# Patient Record
Sex: MALE | Race: WHITE | URBAN - METROPOLITAN AREA
[De-identification: names, ages, dates, MRNs, and addresses within clinical notes are randomized per-mention and may not be internally consistent; named-entity substitution may affect disease eponyms.]

---

## 2020-08-03 ENCOUNTER — HOSPITAL ENCOUNTER (OUTPATIENT)
Age: 50
Discharge: HOME | End: 2020-08-05

## 2020-08-03 PROCEDURE — 88305 TISSUE EXAM BY PATHOLOGIST: CPT

## 2020-09-18 LAB
SARS-COV-2, PCR: NOT DETECTED
SPECIMEN SOURCE: NORMAL

## 2020-09-23 LAB — SURGICAL PATHOLOGY REPORT: NORMAL

## 2021-12-02 RX ORDER — SODIUM CHLORIDE 9 MG/ML
100 INJECTION, SOLUTION INTRAVENOUS CONTINUOUS PRN
Status: CANCELLED | OUTPATIENT
Start: 2021-12-02

## 2021-12-02 RX ORDER — SODIUM CHLORIDE 9 MG/ML
25 INJECTION, SOLUTION INTRAVENOUS PRN
Status: CANCELLED | OUTPATIENT
Start: 2021-12-02

## 2021-12-02 RX ORDER — SODIUM CHLORIDE 9 MG/ML
INJECTION, SOLUTION INTRAVENOUS CONTINUOUS PRN
Status: CANCELLED | OUTPATIENT
Start: 2021-12-02 | End: 2021-12-03

## 2021-12-02 RX ORDER — SODIUM CHLORIDE 0.9 % (FLUSH) 0.9 %
5-40 SYRINGE (ML) INJECTION PRN
Status: CANCELLED | OUTPATIENT
Start: 2021-12-02

## 2021-12-02 RX ORDER — DIPHENHYDRAMINE HYDROCHLORIDE 50 MG/ML
50 INJECTION INTRAMUSCULAR; INTRAVENOUS
Status: CANCELLED | OUTPATIENT
Start: 2021-12-02 | End: 2021-12-02

## 2021-12-02 RX ORDER — METHYLPREDNISOLONE SODIUM SUCCINATE 125 MG/2ML
125 INJECTION, POWDER, LYOPHILIZED, FOR SOLUTION INTRAMUSCULAR; INTRAVENOUS
Status: CANCELLED | OUTPATIENT
Start: 2021-12-02 | End: 2021-12-02

## 2021-12-02 RX ORDER — EPINEPHRINE 1 MG/ML
0.3 INJECTION, SOLUTION, CONCENTRATE INTRAVENOUS PRN
Status: CANCELLED | OUTPATIENT
Start: 2021-12-02

## 2021-12-02 RX ORDER — SODIUM CHLORIDE 0.9 % (FLUSH) 0.9 %
5-40 SYRINGE (ML) INJECTION EVERY 12 HOURS SCHEDULED
Status: CANCELLED | OUTPATIENT
Start: 2021-12-02

## 2021-12-03 ENCOUNTER — HOSPITAL ENCOUNTER (OUTPATIENT)
Dept: INFUSION THERAPY | Age: 51
Setting detail: INFUSION SERIES
Discharge: HOME | End: 2021-12-03
Payer: COMMERCIAL

## 2021-12-03 VITALS
RESPIRATION RATE: 18 BRPM | TEMPERATURE: 99.1 F | OXYGEN SATURATION: 98 % | HEART RATE: 89 BPM | SYSTOLIC BLOOD PRESSURE: 144 MMHG | DIASTOLIC BLOOD PRESSURE: 91 MMHG

## 2021-12-03 PROCEDURE — M0243 CASIRIVI AND IMDEVI INFUSION: HCPCS

## 2021-12-03 PROCEDURE — 6360000002 HC RX W HCPCS: Performed by: INTERNAL MEDICINE

## 2021-12-03 PROCEDURE — 2580000003 HC RX 258: Performed by: INTERNAL MEDICINE

## 2021-12-03 PROCEDURE — 2500000003 HC RX 250 WO HCPCS: Performed by: INTERNAL MEDICINE

## 2021-12-03 RX ORDER — SODIUM CHLORIDE 0.9 % (FLUSH) 0.9 %
5-40 SYRINGE (ML) INJECTION PRN
Status: DISCONTINUED | OUTPATIENT
Start: 2021-12-03 | End: 2021-12-04 | Stop reason: HOSPADM

## 2021-12-03 RX ORDER — SODIUM CHLORIDE 9 MG/ML
INJECTION, SOLUTION INTRAVENOUS CONTINUOUS PRN
Status: ACTIVE | OUTPATIENT
Start: 2021-12-03 | End: 2021-12-03

## 2021-12-03 RX ADMIN — Medication 10 ML: at 13:44

## 2021-12-03 RX ADMIN — SODIUM CHLORIDE: 9 INJECTION, SOLUTION INTRAVENOUS at 13:44

## 2021-12-03 RX ADMIN — Medication 10 ML: at 14:17

## 2021-12-03 RX ADMIN — IMDEVIMAB: 300 INJECTION, SOLUTION, CONCENTRATE INTRAVENOUS at 13:43

## 2021-12-17 ENCOUNTER — HOSPITAL ENCOUNTER (OUTPATIENT)
Age: 51
Discharge: HOME | End: 2021-12-19

## 2021-12-17 PROCEDURE — 88305 TISSUE EXAM BY PATHOLOGIST: CPT

## 2023-08-28 ENCOUNTER — HOSPITAL ENCOUNTER (OUTPATIENT)
Dept: DATA CONVERSION | Facility: HOSPITAL | Age: 53
End: 2023-08-28
Attending: STUDENT IN AN ORGANIZED HEALTH CARE EDUCATION/TRAINING PROGRAM | Admitting: STUDENT IN AN ORGANIZED HEALTH CARE EDUCATION/TRAINING PROGRAM

## 2023-08-28 DIAGNOSIS — R94.39 ABNORMAL RESULT OF OTHER CARDIOVASCULAR FUNCTION STUDY: ICD-10-CM

## 2023-08-28 DIAGNOSIS — I20.9 ANGINA PECTORIS, UNSPECIFIED (CMS-HCC): ICD-10-CM

## 2023-08-28 DIAGNOSIS — R06.02 SHORTNESS OF BREATH: ICD-10-CM

## 2023-08-28 DIAGNOSIS — R07.9 CHEST PAIN, UNSPECIFIED: ICD-10-CM

## 2023-08-28 DIAGNOSIS — I10 ESSENTIAL (PRIMARY) HYPERTENSION: ICD-10-CM

## 2023-08-28 LAB
CHOLESTEROL (MG/DL) IN SER/PLAS: 202 MG/DL (ref 0–199)
CHOLESTEROL IN HDL (MG/DL) IN SER/PLAS: 35.9 MG/DL
CHOLESTEROL/HDL RATIO: 5.6
LDL: 153 MG/DL (ref 0–99)
TRIGLYCERIDE (MG/DL) IN SER/PLAS: 68 MG/DL (ref 0–149)
VLDL: 14 MG/DL (ref 0–40)

## 2023-09-30 NOTE — H&P
History & Physical Reviewed:   I have reviewed the History and Physical dated:  28-Aug-2023   History and Physical reviewed and relevant findings noted. Patient examined to review pertinent physical  findings.: No significant changes   Home Medications Reviewed: no changes noted   Allergies Reviewed: no changes noted       Airway/Sedation Assessment:  ·  Emotional Status calm   ·  Neurologic alert & oriented x 3   ·  Respiratory clear to auscultation   ·  Cardiovascular rhythm & rate regular   ·  GI/ soft, nontender     · Pulses present: Pedal Left, Pedal Right, Radial Left, Radial Right     ·  Oropharyngeal Classification Class II   ·  ASA PS Classification ASA III   ·  Sedation Plan moderate sedation       ERAS (Enhanced Recovery After Surgery):  ·  ERAS Patient: no      Consent:   COVID-19 Consent:  ·  COVID-19 Risk Consent Surgeon has reviewed key risks related to the risk of geoff COVID-19 and if they contract COVID-19 what the risks are.     Attestation:   Note Completion:  I am a:  Resident/Fellow   Attending Attestation I saw and evaluated the patient.  I personally obtained the key and critical portions of the history and physical exam or was physically present for key and  critical portions performed by the resident/fellow. I reviewed the resident/fellow?s documentation and discussed the patient with the resident/fellow.  I agree with the resident/fellow?s medical decision making as documented in the note.     I personally evaluated the patient on 28-Aug-2023         Electronic Signatures:  Javier Gerardo)  (Signed 28-Aug-2023 11:00)   Authored: ERAS, Note Completion   Co-Signer: History & Physical Reviewed, Airway/Sedation, Consent, Note Completion  Yanely Gama (Fellow))  (Signed 28-Aug-2023 08:54)   Authored: History & Physical Reviewed, Airway/Sedation,  Consent, Note Completion      Last Updated: 28-Aug-2023 11:00 by Javier Gerardo)

## 2023-10-31 DIAGNOSIS — I10 HYPERTENSION, UNSPECIFIED TYPE: Primary | ICD-10-CM

## 2023-10-31 RX ORDER — AMLODIPINE BESYLATE 10 MG/1
10 TABLET ORAL DAILY
Qty: 90 TABLET | Refills: 0 | Status: SHIPPED | OUTPATIENT
Start: 2023-10-31

## 2023-10-31 RX ORDER — AMLODIPINE BESYLATE 5 MG/1
5 TABLET ORAL DAILY
COMMUNITY
Start: 2023-09-18 | End: 2023-10-31 | Stop reason: SDUPTHER

## 2023-11-02 ENCOUNTER — APPOINTMENT (OUTPATIENT)
Dept: CARDIOLOGY | Facility: CLINIC | Age: 53
End: 2023-11-02

## 2024-08-11 ENCOUNTER — APPOINTMENT (OUTPATIENT)
Dept: RADIOLOGY | Facility: HOSPITAL | Age: 54
End: 2024-08-11
Payer: COMMERCIAL

## 2024-08-11 ENCOUNTER — APPOINTMENT (OUTPATIENT)
Dept: CARDIOLOGY | Facility: HOSPITAL | Age: 54
End: 2024-08-11
Payer: COMMERCIAL

## 2024-08-11 ENCOUNTER — HOSPITAL ENCOUNTER (INPATIENT)
Facility: HOSPITAL | Age: 54
End: 2024-08-11
Attending: EMERGENCY MEDICINE | Admitting: INTERNAL MEDICINE
Payer: COMMERCIAL

## 2024-08-11 VITALS
HEART RATE: 75 BPM | HEIGHT: 72 IN | TEMPERATURE: 96 F | WEIGHT: 208 LBS | OXYGEN SATURATION: 96 % | BODY MASS INDEX: 28.17 KG/M2 | RESPIRATION RATE: 18 BRPM | DIASTOLIC BLOOD PRESSURE: 75 MMHG | SYSTOLIC BLOOD PRESSURE: 120 MMHG

## 2024-08-11 DIAGNOSIS — I26.92 ACUTE SADDLE PULMONARY EMBOLISM, UNSPECIFIED WHETHER ACUTE COR PULMONALE PRESENT (MULTI): ICD-10-CM

## 2024-08-11 DIAGNOSIS — I21.4 NSTEMI (NON-ST ELEVATED MYOCARDIAL INFARCTION) (MULTI): Primary | ICD-10-CM

## 2024-08-11 PROBLEM — I10 HYPERTENSION: Status: ACTIVE | Noted: 2024-08-11

## 2024-08-11 PROBLEM — I10 PRIMARY HYPERTENSION: Status: ACTIVE | Noted: 2024-08-11

## 2024-08-11 LAB
ALBUMIN SERPL BCP-MCNC: 4.4 G/DL (ref 3.4–5)
ALP SERPL-CCNC: 45 U/L (ref 33–120)
ALT SERPL W P-5'-P-CCNC: 17 U/L (ref 10–52)
ANION GAP SERPL CALC-SCNC: 11 MMOL/L (ref 10–20)
AORTIC VALVE MEAN GRADIENT: 3 MMHG
AORTIC VALVE PEAK VELOCITY: 1.05 M/S
APTT PPP: 31 SECONDS (ref 27–38)
AST SERPL W P-5'-P-CCNC: 14 U/L (ref 9–39)
AV PEAK GRADIENT: 4.4 MMHG
AVA (PEAK VEL): 2.89 CM2
AVA (VTI): 2.77 CM2
BASOPHILS # BLD AUTO: 0.02 X10*3/UL (ref 0–0.1)
BASOPHILS NFR BLD AUTO: 0.2 %
BILIRUB SERPL-MCNC: 0.7 MG/DL (ref 0–1.2)
BNP SERPL-MCNC: 77 PG/ML (ref 0–99)
BUN SERPL-MCNC: 15 MG/DL (ref 6–23)
CALCIUM SERPL-MCNC: 9.7 MG/DL (ref 8.6–10.3)
CARDIAC TROPONIN I PNL SERPL HS: 244 NG/L (ref 0–20)
CARDIAC TROPONIN I PNL SERPL HS: 251 NG/L (ref 0–20)
CHLORIDE SERPL-SCNC: 102 MMOL/L (ref 98–107)
CO2 SERPL-SCNC: 27 MMOL/L (ref 21–32)
CREAT SERPL-MCNC: 1.13 MG/DL (ref 0.5–1.3)
EGFRCR SERPLBLD CKD-EPI 2021: 77 ML/MIN/1.73M*2
EJECTION FRACTION APICAL 4 CHAMBER: 58.2
EJECTION FRACTION: 61 %
EOSINOPHIL # BLD AUTO: 0.07 X10*3/UL (ref 0–0.7)
EOSINOPHIL NFR BLD AUTO: 0.8 %
ERYTHROCYTE [DISTWIDTH] IN BLOOD BY AUTOMATED COUNT: 14.4 % (ref 11.5–14.5)
GLOBAL LONGITUDINAL STRAIN: 6.1 %
GLUCOSE SERPL-MCNC: 112 MG/DL (ref 74–99)
HCT VFR BLD AUTO: 51.3 % (ref 41–52)
HGB BLD-MCNC: 17.8 G/DL (ref 13.5–17.5)
IMM GRANULOCYTES # BLD AUTO: 0.04 X10*3/UL (ref 0–0.7)
IMM GRANULOCYTES NFR BLD AUTO: 0.4 % (ref 0–0.9)
INR PPP: 1 (ref 0.9–1.1)
LACTATE SERPL-SCNC: 1 MMOL/L (ref 0.4–2)
LEFT ATRIUM VOLUME AREA LENGTH INDEX BSA: 9.4 ML/M2
LEFT VENTRICLE INTERNAL DIMENSION DIASTOLE: 4.2 CM (ref 3.5–6)
LEFT VENTRICULAR OUTFLOW TRACT DIAMETER: 2.1 CM
LV EJECTION FRACTION BIPLANE: 61 %
LYMPHOCYTES # BLD AUTO: 1.62 X10*3/UL (ref 1.2–4.8)
LYMPHOCYTES NFR BLD AUTO: 18.2 %
MAGNESIUM SERPL-MCNC: 2.06 MG/DL (ref 1.6–2.4)
MCH RBC QN AUTO: 31.7 PG (ref 26–34)
MCHC RBC AUTO-ENTMCNC: 34.7 G/DL (ref 32–36)
MCV RBC AUTO: 91 FL (ref 80–100)
MITRAL VALVE E/A RATIO: 0.49
MONOCYTES # BLD AUTO: 0.67 X10*3/UL (ref 0.1–1)
MONOCYTES NFR BLD AUTO: 7.5 %
NEUTROPHILS # BLD AUTO: 6.47 X10*3/UL (ref 1.2–7.7)
NEUTROPHILS NFR BLD AUTO: 72.9 %
NRBC BLD-RTO: 0 /100 WBCS (ref 0–0)
PLATELET # BLD AUTO: 208 X10*3/UL (ref 150–450)
POTASSIUM SERPL-SCNC: 4.1 MMOL/L (ref 3.5–5.3)
PROT SERPL-MCNC: 7.8 G/DL (ref 6.4–8.2)
PROTHROMBIN TIME: 11.2 SECONDS (ref 9.8–12.8)
RBC # BLD AUTO: 5.62 X10*6/UL (ref 4.5–5.9)
RIGHT VENTRICLE FREE WALL PEAK S': 9.79 CM/S
RIGHT VENTRICLE PEAK SYSTOLIC PRESSURE: 31.9 MMHG
SODIUM SERPL-SCNC: 136 MMOL/L (ref 136–145)
TRICUSPID ANNULAR PLANE SYSTOLIC EXCURSION: 1.3 CM
UFH PPP CHRO-ACNC: 0.2 IU/ML
UFH PPP CHRO-ACNC: 0.3 IU/ML
WBC # BLD AUTO: 8.9 X10*3/UL (ref 4.4–11.3)

## 2024-08-11 PROCEDURE — 83605 ASSAY OF LACTIC ACID: CPT | Performed by: PHYSICIAN ASSISTANT

## 2024-08-11 PROCEDURE — 2500000001 HC RX 250 WO HCPCS SELF ADMINISTERED DRUGS (ALT 637 FOR MEDICARE OP): Performed by: PHYSICIAN ASSISTANT

## 2024-08-11 PROCEDURE — 93005 ELECTROCARDIOGRAM TRACING: CPT

## 2024-08-11 PROCEDURE — 2500000004 HC RX 250 GENERAL PHARMACY W/ HCPCS (ALT 636 FOR OP/ED): Performed by: PHYSICIAN ASSISTANT

## 2024-08-11 PROCEDURE — 99223 1ST HOSP IP/OBS HIGH 75: CPT | Performed by: INTERNAL MEDICINE

## 2024-08-11 PROCEDURE — 80053 COMPREHEN METABOLIC PANEL: CPT | Performed by: PHYSICIAN ASSISTANT

## 2024-08-11 PROCEDURE — 93306 TTE W/DOPPLER COMPLETE: CPT

## 2024-08-11 PROCEDURE — 83735 ASSAY OF MAGNESIUM: CPT | Performed by: PHYSICIAN ASSISTANT

## 2024-08-11 PROCEDURE — 93306 TTE W/DOPPLER COMPLETE: CPT | Performed by: INTERNAL MEDICINE

## 2024-08-11 PROCEDURE — 71045 X-RAY EXAM CHEST 1 VIEW: CPT | Performed by: RADIOLOGY

## 2024-08-11 PROCEDURE — 85025 COMPLETE CBC W/AUTO DIFF WBC: CPT | Performed by: PHYSICIAN ASSISTANT

## 2024-08-11 PROCEDURE — 84484 ASSAY OF TROPONIN QUANT: CPT | Performed by: PHYSICIAN ASSISTANT

## 2024-08-11 PROCEDURE — 96374 THER/PROPH/DIAG INJ IV PUSH: CPT

## 2024-08-11 PROCEDURE — 93970 EXTREMITY STUDY: CPT | Performed by: RADIOLOGY

## 2024-08-11 PROCEDURE — 85520 HEPARIN ASSAY: CPT | Performed by: PHYSICIAN ASSISTANT

## 2024-08-11 PROCEDURE — 36415 COLL VENOUS BLD VENIPUNCTURE: CPT | Performed by: PHYSICIAN ASSISTANT

## 2024-08-11 PROCEDURE — 99291 CRITICAL CARE FIRST HOUR: CPT

## 2024-08-11 PROCEDURE — 71275 CT ANGIOGRAPHY CHEST: CPT | Performed by: RADIOLOGY

## 2024-08-11 PROCEDURE — 2550000001 HC RX 255 CONTRASTS: Performed by: EMERGENCY MEDICINE

## 2024-08-11 PROCEDURE — 2500000004 HC RX 250 GENERAL PHARMACY W/ HCPCS (ALT 636 FOR OP/ED): Performed by: INTERNAL MEDICINE

## 2024-08-11 PROCEDURE — 2060000001 HC INTERMEDIATE ICU ROOM DAILY

## 2024-08-11 PROCEDURE — 85610 PROTHROMBIN TIME: CPT | Performed by: PHYSICIAN ASSISTANT

## 2024-08-11 PROCEDURE — 2500000001 HC RX 250 WO HCPCS SELF ADMINISTERED DRUGS (ALT 637 FOR MEDICARE OP): Performed by: NURSE PRACTITIONER

## 2024-08-11 PROCEDURE — 71045 X-RAY EXAM CHEST 1 VIEW: CPT

## 2024-08-11 PROCEDURE — 71275 CT ANGIOGRAPHY CHEST: CPT

## 2024-08-11 PROCEDURE — 93970 EXTREMITY STUDY: CPT

## 2024-08-11 PROCEDURE — 99223 1ST HOSP IP/OBS HIGH 75: CPT | Performed by: NURSE PRACTITIONER

## 2024-08-11 PROCEDURE — 83880 ASSAY OF NATRIURETIC PEPTIDE: CPT | Performed by: PHYSICIAN ASSISTANT

## 2024-08-11 RX ORDER — POLYETHYLENE GLYCOL 3350 17 G/17G
17 POWDER, FOR SOLUTION ORAL DAILY
Status: ACTIVE | OUTPATIENT
Start: 2024-08-11

## 2024-08-11 RX ORDER — HEPARIN SODIUM 10000 [USP'U]/100ML
0-4000 INJECTION, SOLUTION INTRAVENOUS CONTINUOUS
Status: DISPENSED | OUTPATIENT
Start: 2024-08-11

## 2024-08-11 RX ORDER — MORPHINE SULFATE 2 MG/ML
2 INJECTION, SOLUTION INTRAMUSCULAR; INTRAVENOUS EVERY 4 HOURS PRN
Status: ACTIVE | OUTPATIENT
Start: 2024-08-11

## 2024-08-11 RX ORDER — LOSARTAN POTASSIUM 50 MG/1
50 TABLET ORAL DAILY
Status: DISPENSED | OUTPATIENT
Start: 2024-08-11

## 2024-08-11 RX ORDER — ACETAMINOPHEN 160 MG/5ML
650 SOLUTION ORAL EVERY 4 HOURS PRN
Status: ACTIVE | OUTPATIENT
Start: 2024-08-11

## 2024-08-11 RX ORDER — PANTOPRAZOLE SODIUM 40 MG/1
40 TABLET, DELAYED RELEASE ORAL
Status: ACTIVE | OUTPATIENT
Start: 2024-08-12

## 2024-08-11 RX ORDER — PANTOPRAZOLE SODIUM 40 MG/10ML
40 INJECTION, POWDER, LYOPHILIZED, FOR SOLUTION INTRAVENOUS
Status: ACTIVE | OUTPATIENT
Start: 2024-08-12

## 2024-08-11 RX ORDER — ACETAMINOPHEN 325 MG/1
650 TABLET ORAL EVERY 4 HOURS PRN
Status: ACTIVE | OUTPATIENT
Start: 2024-08-11

## 2024-08-11 RX ORDER — ACETAMINOPHEN 650 MG/1
650 SUPPOSITORY RECTAL EVERY 4 HOURS PRN
Status: ACTIVE | OUTPATIENT
Start: 2024-08-11

## 2024-08-11 RX ORDER — LOSARTAN POTASSIUM 50 MG/1
50 TABLET ORAL DAILY
Status: ON HOLD | COMMUNITY

## 2024-08-11 RX ORDER — NAPROXEN SODIUM 220 MG/1
324 TABLET, FILM COATED ORAL ONCE
Status: COMPLETED | OUTPATIENT
Start: 2024-08-11 | End: 2024-08-11

## 2024-08-11 RX ORDER — AMLODIPINE BESYLATE 5 MG/1
10 TABLET ORAL DAILY
Status: CANCELLED | OUTPATIENT
Start: 2024-08-11

## 2024-08-11 RX ADMIN — ASPIRIN 81 MG CHEWABLE TABLET 324 MG: 81 TABLET CHEWABLE at 09:58

## 2024-08-11 RX ADMIN — IOHEXOL 75 ML: 350 INJECTION, SOLUTION INTRAVENOUS at 09:48

## 2024-08-11 RX ADMIN — LOSARTAN POTASSIUM 50 MG: 50 TABLET, FILM COATED ORAL at 14:22

## 2024-08-11 RX ADMIN — HEPARIN SODIUM 1200 UNITS/HR: 10000 INJECTION, SOLUTION INTRAVENOUS at 16:04

## 2024-08-11 RX ADMIN — HEPARIN SODIUM 1000 UNITS/HR: 10000 INJECTION, SOLUTION INTRAVENOUS at 09:58

## 2024-08-11 RX ADMIN — HUMAN ALBUMIN MICROSPHERES AND PERFLUTREN 0.5 ML: 10; .22 INJECTION, SOLUTION INTRAVENOUS at 12:21

## 2024-08-11 SDOH — SOCIAL STABILITY: SOCIAL INSECURITY: HAVE YOU HAD ANY THOUGHTS OF HARMING ANYONE ELSE?: NO

## 2024-08-11 SDOH — SOCIAL STABILITY: SOCIAL INSECURITY: HAS ANYONE EVER THREATENED TO HURT YOUR FAMILY OR YOUR PETS?: NO

## 2024-08-11 SDOH — SOCIAL STABILITY: SOCIAL INSECURITY: ARE YOU OR HAVE YOU BEEN THREATENED OR ABUSED PHYSICALLY, EMOTIONALLY, OR SEXUALLY BY ANYONE?: NO

## 2024-08-11 SDOH — SOCIAL STABILITY: SOCIAL INSECURITY: DO YOU FEEL ANYONE HAS EXPLOITED OR TAKEN ADVANTAGE OF YOU FINANCIALLY OR OF YOUR PERSONAL PROPERTY?: NO

## 2024-08-11 SDOH — SOCIAL STABILITY: SOCIAL INSECURITY: ABUSE: ADULT

## 2024-08-11 SDOH — SOCIAL STABILITY: SOCIAL INSECURITY: DOES ANYONE TRY TO KEEP YOU FROM HAVING/CONTACTING OTHER FRIENDS OR DOING THINGS OUTSIDE YOUR HOME?: NO

## 2024-08-11 SDOH — SOCIAL STABILITY: SOCIAL INSECURITY: DO YOU FEEL UNSAFE GOING BACK TO THE PLACE WHERE YOU ARE LIVING?: NO

## 2024-08-11 SDOH — SOCIAL STABILITY: SOCIAL INSECURITY: HAVE YOU HAD THOUGHTS OF HARMING ANYONE ELSE?: NO

## 2024-08-11 SDOH — SOCIAL STABILITY: SOCIAL INSECURITY: WERE YOU ABLE TO COMPLETE ALL THE BEHAVIORAL HEALTH SCREENINGS?: YES

## 2024-08-11 SDOH — SOCIAL STABILITY: SOCIAL INSECURITY: ARE THERE ANY APPARENT SIGNS OF INJURIES/BEHAVIORS THAT COULD BE RELATED TO ABUSE/NEGLECT?: NO

## 2024-08-11 ASSESSMENT — ENCOUNTER SYMPTOMS
BLOOD IN STOOL: 0
WOUND: 0
ABDOMINAL PAIN: 0
COLOR CHANGE: 0
WEAKNESS: 0
BACK PAIN: 0
ADENOPATHY: 0
NAUSEA: 0
EYE DISCHARGE: 0
BRUISES/BLEEDS EASILY: 0
DIFFICULTY URINATING: 0
CHEST TIGHTNESS: 0
EYE PAIN: 0
HEMATURIA: 0
APPETITE CHANGE: 0
UNEXPECTED WEIGHT CHANGE: 0
POLYDIPSIA: 0
DYSPHORIC MOOD: 0
COUGH: 0
NUMBNESS: 0
TROUBLE SWALLOWING: 0
NECK STIFFNESS: 0
HALLUCINATIONS: 0
NECK PAIN: 0
APNEA: 0
SINUS PAIN: 0
SEIZURES: 0
LIGHT-HEADEDNESS: 0
VOMITING: 0
POLYPHAGIA: 0
CHILLS: 0
FLANK PAIN: 0
SHORTNESS OF BREATH: 1
TREMORS: 0
SLEEP DISTURBANCE: 0
FREQUENCY: 0
NERVOUS/ANXIOUS: 0
FEVER: 0
FATIGUE: 0
SPEECH DIFFICULTY: 0
EYE ITCHING: 0
HEADACHES: 0
DIARRHEA: 0
DIZZINESS: 0
MYALGIAS: 0
SORE THROAT: 0
CONFUSION: 0
CHOKING: 0
CONSTIPATION: 0
ABDOMINAL DISTENTION: 0
WHEEZING: 0
PALPITATIONS: 0
ARTHRALGIAS: 0
DYSURIA: 0
VOICE CHANGE: 0
PHOTOPHOBIA: 0

## 2024-08-11 ASSESSMENT — PAIN - FUNCTIONAL ASSESSMENT: PAIN_FUNCTIONAL_ASSESSMENT: 0-10

## 2024-08-11 ASSESSMENT — LIFESTYLE VARIABLES
SKIP TO QUESTIONS 9-10: 0
AUDIT TOTAL SCORE: 0
HOW OFTEN DURING THE LAST YEAR HAVE YOU FOUND THAT YOU WERE NOT ABLE TO STOP DRINKING ONCE YOU HAD STARTED: NEVER
AUDIT-C TOTAL SCORE: 5
HAS A RELATIVE, FRIEND, DOCTOR, OR ANOTHER HEALTH PROFESSIONAL EXPRESSED CONCERN ABOUT YOUR DRINKING OR SUGGESTED YOU CUT DOWN: NO
HOW MANY STANDARD DRINKS CONTAINING ALCOHOL DO YOU HAVE ON A TYPICAL DAY: 1 OR 2
PRESCIPTION_ABUSE_PAST_12_MONTHS: NO
HOW OFTEN DURING THE LAST YEAR HAVE YOU BEEN UNABLE TO REMEMBER WHAT HAPPENED THE NIGHT BEFORE BECAUSE YOU HAD BEEN DRINKING: NEVER
AUDIT-C TOTAL SCORE: 5
HAVE YOU OR SOMEONE ELSE BEEN INJURED AS A RESULT OF YOUR DRINKING: NO
AUDIT TOTAL SCORE: 5
HOW OFTEN DURING THE LAST YEAR HAVE YOU FAILED TO DO WHAT WAS NORMALLY EXPECTED FROM YOU BECAUSE OF DRINKING: NEVER
HOW OFTEN DURING THE LAST YEAR HAVE YOU NEEDED AN ALCOHOLIC DRINK FIRST THING IN THE MORNING TO GET YOURSELF GOING AFTER A NIGHT OF HEAVY DRINKING: NEVER
SUBSTANCE_ABUSE_PAST_12_MONTHS: NO
HOW OFTEN DURING THE LAST YEAR HAVE YOU HAD A FEELING OF GUILT OR REMORSE AFTER DRINKING: NEVER
HOW OFTEN DO YOU HAVE A DRINK CONTAINING ALCOHOL: 4 OR MORE TIMES A WEEK
HOW OFTEN DO YOU HAVE 6 OR MORE DRINKS ON ONE OCCASION: LESS THAN MONTHLY

## 2024-08-11 ASSESSMENT — ACTIVITIES OF DAILY LIVING (ADL)
TOILETING: INDEPENDENT
WALKS IN HOME: INDEPENDENT
ADEQUATE_TO_COMPLETE_ADL: YES
GROOMING: INDEPENDENT
LACK_OF_TRANSPORTATION: NO
HEARING - RIGHT EAR: FUNCTIONAL
JUDGMENT_ADEQUATE_SAFELY_COMPLETE_DAILY_ACTIVITIES: YES
BATHING: INDEPENDENT
FEEDING YOURSELF: INDEPENDENT
HEARING - LEFT EAR: DEAF
DRESSING YOURSELF: INDEPENDENT
LACK_OF_TRANSPORTATION: NO
PATIENT'S MEMORY ADEQUATE TO SAFELY COMPLETE DAILY ACTIVITIES?: YES

## 2024-08-11 ASSESSMENT — COLUMBIA-SUICIDE SEVERITY RATING SCALE - C-SSRS
6. HAVE YOU EVER DONE ANYTHING, STARTED TO DO ANYTHING, OR PREPARED TO DO ANYTHING TO END YOUR LIFE?: NO
1. IN THE PAST MONTH, HAVE YOU WISHED YOU WERE DEAD OR WISHED YOU COULD GO TO SLEEP AND NOT WAKE UP?: NO
2. HAVE YOU ACTUALLY HAD ANY THOUGHTS OF KILLING YOURSELF?: NO

## 2024-08-11 ASSESSMENT — COGNITIVE AND FUNCTIONAL STATUS - GENERAL: PATIENT BASELINE BEDBOUND: YES

## 2024-08-11 ASSESSMENT — PATIENT HEALTH QUESTIONNAIRE - PHQ9
SUM OF ALL RESPONSES TO PHQ9 QUESTIONS 1 & 2: 0
2. FEELING DOWN, DEPRESSED OR HOPELESS: NOT AT ALL
1. LITTLE INTEREST OR PLEASURE IN DOING THINGS: NOT AT ALL

## 2024-08-11 ASSESSMENT — PAIN SCALES - GENERAL: PAINLEVEL_OUTOF10: 5 - MODERATE PAIN

## 2024-08-11 NOTE — H&P
History Obtained From: patient    History Of Present Illness:  Julio Cesar Casey is a 54 y.o. male with PMHx s/f HTN, nonobstructive CAD presenting with Chest pain .  The patient presented to emergency department with complaint of heaviness in the chest shortness of breath some chest discomfort starting yesterday.  Patient states that symptoms were worse when he gets up and moves he even just going to the bathroom and back patient had developed some shortness of breath and chest heaviness.  At rest the patient is not having any chest heaviness.  Patient reports a prior history of cardiac cath they told him he had only like 10% blockage or something.  Patient has not had any recent surgery he did have a trip to [place] and back in June.  He denies any fevers chills rigors any purulent sputum any hemoptysis any leg pain.  Vital signs in the emergency department temperature 97.6 heart rate 98 respiratory rate 20 blood pressure 138/87 SpO2 98% on room air.  Chemistry panel has glucose of 112 but otherwise comprehensive metabolic panel is unremarkable.  Troponin initially 250 1 repeat troponin 244 BN peptide 77 lactate 1.0 INR 1.0 CBC is without leukocytosis hemoglobin is 17.8 hematocrit 51.3 platelets 208.  EKG showing sinus rhythm no intraventricular conduction delay no ST elevation.  A CTA of the chest was done to evaluate for pulmonary embolism there were findings of extensive pulmonary emboli settling over the bifurcation of the main pulmonary artery extending into the right left pulmonary arteries additional emboli extend into the lobar branches and a partially occlusive in the right lower lobe peripheral flow is identified.  There is right ventricular strain.  The ED provider contacted the PERT team.  Given that patient was normotensive heart rate was around 100 was advised to check additional lab studies which have been relatively unremarkable.  Patient was felt not to need emergent intervention from the PERT team.  The  patient will obtain echocardiogram started on IV heparin and be admitted to the stepdown unit.      ED Course:  ED Course as of 08/11/24 1133   Sun Aug 11, 2024   0811 EKG performed 8/11/2024 at 8:11 AM.  Sinus rhythm with a ventricular rate of 9 6 bpm, parable of 106 ms, QT/QTc of 351/444 ms.  No STEMI. [CJ]   0944 Discussed the case with Dr. Sanford the on-call cardiologist.  He agrees to starting patient on heparin, admission, and consult placed to cardiology. [CJ]   1005 Spoke to the on-call MICU fellow Dr. Martin for discussion with PERT team.  We discussed the case.  She states that they would patient and then call back with decisions. [CJ]   1007 Troponin I, High Sensitivity(!!): 244 [CJ]   1007 Delta Trop stable [CJ]   1021 Discussed the case with the MICU fellow again Dr. Martin.  She discussed with PERT team.  They think that since the patient is so stable that he can be admitted here.  They do recommend DVT ultrasounds as well as echo during inpatient workup.  They state if anything else were to be abnormal they can be contacted again. [CJ]   1039 Updated Dr. Sanford about the patient.  I informed him we will be placing a cardiac consult patient on PERT team recommendation.  Ultrasound of legs was ordered at this time. [CJ]   1103 Hospitalist ALFONSO agreed to admit under Dr. Rivero.  [CJ]      ED Course User Index  [CJ] Eyad Singh PA-C         Diagnoses as of 08/11/24 1133   NSTEMI (non-ST elevated myocardial infarction) (Multi)   Acute saddle pulmonary embolism, unspecified whether acute cor pulmonale present (Multi)     Relevant Results  Results for orders placed or performed during the hospital encounter of 08/11/24 (from the past 24 hour(s))   CBC and Auto Differential   Result Value Ref Range    WBC 8.9 4.4 - 11.3 x10*3/uL    nRBC 0.0 0.0 - 0.0 /100 WBCs    RBC 5.62 4.50 - 5.90 x10*6/uL    Hemoglobin 17.8 (H) 13.5 - 17.5 g/dL    Hematocrit 51.3 41.0 - 52.0 %    MCV 91 80 - 100 fL    MCH 31.7 26.0 - 34.0 pg     MCHC 34.7 32.0 - 36.0 g/dL    RDW 14.4 11.5 - 14.5 %    Platelets 208 150 - 450 x10*3/uL    Neutrophils % 72.9 40.0 - 80.0 %    Immature Granulocytes %, Automated 0.4 0.0 - 0.9 %    Lymphocytes % 18.2 13.0 - 44.0 %    Monocytes % 7.5 2.0 - 10.0 %    Eosinophils % 0.8 0.0 - 6.0 %    Basophils % 0.2 0.0 - 2.0 %    Neutrophils Absolute 6.47 1.20 - 7.70 x10*3/uL    Immature Granulocytes Absolute, Automated 0.04 0.00 - 0.70 x10*3/uL    Lymphocytes Absolute 1.62 1.20 - 4.80 x10*3/uL    Monocytes Absolute 0.67 0.10 - 1.00 x10*3/uL    Eosinophils Absolute 0.07 0.00 - 0.70 x10*3/uL    Basophils Absolute 0.02 0.00 - 0.10 x10*3/uL   Comprehensive Metabolic Panel   Result Value Ref Range    Glucose 112 (H) 74 - 99 mg/dL    Sodium 136 136 - 145 mmol/L    Potassium 4.1 3.5 - 5.3 mmol/L    Chloride 102 98 - 107 mmol/L    Bicarbonate 27 21 - 32 mmol/L    Anion Gap 11 10 - 20 mmol/L    Urea Nitrogen 15 6 - 23 mg/dL    Creatinine 1.13 0.50 - 1.30 mg/dL    eGFR 77 >60 mL/min/1.73m*2    Calcium 9.7 8.6 - 10.3 mg/dL    Albumin 4.4 3.4 - 5.0 g/dL    Alkaline Phosphatase 45 33 - 120 U/L    Total Protein 7.8 6.4 - 8.2 g/dL    AST 14 9 - 39 U/L    Bilirubin, Total 0.7 0.0 - 1.2 mg/dL    ALT 17 10 - 52 U/L   Magnesium   Result Value Ref Range    Magnesium 2.06 1.60 - 2.40 mg/dL   Coagulation Screen   Result Value Ref Range    Protime 11.2 9.8 - 12.8 seconds    INR 1.0 0.9 - 1.1    aPTT 31 27 - 38 seconds   Troponin I, High Sensitivity, Initial   Result Value Ref Range    Troponin I, High Sensitivity 251 (HH) 0 - 20 ng/L   Troponin, High Sensitivity, 1 Hour   Result Value Ref Range    Troponin I, High Sensitivity 244 (HH) 0 - 20 ng/L   B-type natriuretic peptide   Result Value Ref Range    BNP 77 0 - 99 pg/mL   Lactate   Result Value Ref Range    Lactate 1.0 0.4 - 2.0 mmol/L      CT angio chest for pulmonary embolism    Result Date: 8/11/2024  Interpreted By:  Schoenberger, Joseph, STUDY: CT ANGIO CHEST FOR PULMONARY EMBOLISM;  8/11/2024 9:47  am   INDICATION: Signs/Symptoms:Chest tightness, shortness of breath with exertion.   COMPARISON: None.   ACCESSION NUMBER(S): [id number]   ORDERING CLINICIAN: FARHAD SANTOS   TECHNIQUE: Helical data acquisition of the chest was obtained with the intravenous injection of 75 cc Omnipaque 350.. Images were reformatted in coronal and sagittal planes. Axial and coronal MIP images were created and reviewed.   FINDINGS: POTENTIAL LIMITATIONS OF THE STUDY: None   HEART AND VESSELS: There is a large pulmonary embolus salvage over the bifurcation of the main pulmonary artery extending into the right left pulmonary arteries. Additionally emboli extend into the lobar branches partially occlusive in the right lower lobe. Peripheral flow is identified.   Main pulmonary artery and its branches are normal in caliber.   The thoracic aorta is normal in course and caliber opacify normally without significant atherosclerotic calcifications and no evidence for dissection.   No coronary artery calcifications are seen.The study is not optimized for evaluation of coronary arteries.   The cardiac chambers are not enlarged. However, there is a reversal of the right ventricular left ventricular diameter ratio with slight bowing of the intraventricular septum suggesting right ventricular strain.   No evidence of pericardial effusion.   MEDIASTINUM AND MIKAELA, LOWER NECK AND AXILLA: The thyroid appears normal.   There is no thoracic lymphadenopathy.   The esophagus appears normal.   LUNGS AND AIRWAYS: The trachea and central airways are patent. No endobronchial lesion.   There is no pleural effusion, pneumothorax, or airspace opacity.   UPPER ABDOMEN: Dense calcification in the gallbladder neck. No hydrops, wall thickening, or pericholecystic inflammatory findings. Otherwise unremarkable.   CHEST WALL AND OSSEOUS STRUCTURES: There are no suspicious osseous lesions. Multilevel degenerative changes are present       1.  Extensive pulmonary emboli  with right ventricular strain as detailed above. 2. Cholelithiasis without acute cholecystitis.     MACRO: None   Signed by: Joseph Schoenberger 8/11/2024 9:56 AM Dictation workstation:   [id number]    XR chest 1 view    Result Date: 8/11/2024  Interpreted By:  Schoenberger, Joseph, STUDY: XR CHEST 1 VIEW;  8/11/2024 9:01 am   INDICATION: Signs/Symptoms:Chest Pain.   COMPARISON: 08/17/2023   ACCESSION NUMBER(S): [id number]   ORDERING CLINICIAN: FARHAD SANTOS   FINDINGS:         CARDIOMEDIASTINAL SILHOUETTE: Cardiomediastinal silhouette is normal in size and configuration.   LUNGS: Lungs are clear.   ABDOMEN: No remarkable upper abdominal findings.   BONES: No acute osseous changes.       1.  No evidence of acute cardiopulmonary process.       MACRO: None   Signed by: Joseph Schoenberger 8/11/2024 9:06 AM Dictation workstation:   [id number]     Scheduled medications:     Continuous medications:  heparin, 0-4,000 Units/hr, Last Rate: 1,000 Units/hr (08/11/24 0958)      PRN medications:  PRN medications: heparin      Past Medical History  He has no past medical history on file.  Hypertension, nonobstructive coronary artery disease    Surgical History  He has no past surgical history on file.  Diagnostic coronary angiogram, colonoscopy     Social History  He reports that he has never smoked. He has never used smokeless tobacco. He reports current alcohol use. He reports that he does not use drugs.    Family History  No family history on file.  Colon cancer     Allergies  Penicillins    Code Status  No Order     Review of Systems   Constitutional:  Negative for appetite change, chills, fatigue, fever and unexpected weight change.   HENT:  Negative for congestion, ear discharge, ear pain, mouth sores, nosebleeds, sinus pain, sore throat, trouble swallowing and voice change.    Eyes:  Negative for photophobia, pain, discharge, itching and visual disturbance.   Respiratory:  Positive for shortness of breath. Negative for  apnea, cough, choking, chest tightness and wheezing.    Cardiovascular:  Positive for chest pain. Negative for palpitations and leg swelling.   Gastrointestinal:  Negative for abdominal distention, abdominal pain, blood in stool, constipation, diarrhea, nausea and vomiting.   Endocrine: Negative for cold intolerance, heat intolerance, polydipsia, polyphagia and polyuria.   Genitourinary:  Negative for decreased urine volume, difficulty urinating, dysuria, flank pain, frequency, hematuria and urgency.   Musculoskeletal:  Negative for arthralgias, back pain, gait problem, myalgias, neck pain and neck stiffness.   Skin:  Negative for color change, pallor and wound.   Allergic/Immunologic: Negative for food allergies and immunocompromised state.   Neurological:  Negative for dizziness, tremors, seizures, syncope, speech difficulty, weakness, light-headedness, numbness and headaches.   Hematological:  Negative for adenopathy. Does not bruise/bleed easily.   Psychiatric/Behavioral:  Negative for confusion, dysphoric mood, hallucinations, sleep disturbance and suicidal ideas. The patient is not nervous/anxious.        Last Recorded Vitals  BP (!) 148/97   Pulse 90   Temp 36.4 °C (97.6 °F) (Tympanic)   Resp 16   Wt 94.3 kg (208 lb)   SpO2 97%      Physical Exam  Vitals reviewed.   Constitutional:       General: He is not in acute distress.  HENT:      Head: Normocephalic and atraumatic.      Right Ear: External ear normal.      Left Ear: External ear normal.      Nose: Nose normal.      Mouth/Throat:      Mouth: Mucous membranes are moist.      Pharynx: Oropharynx is clear.   Eyes:      General: No scleral icterus.     Extraocular Movements: Extraocular movements intact.      Conjunctiva/sclera: Conjunctivae normal.      Pupils: Pupils are equal, round, and reactive to light.   Cardiovascular:      Rate and Rhythm: Normal rate and regular rhythm.      Pulses: Normal pulses.      Heart sounds: Normal heart sounds. No  murmur heard.  Pulmonary:      Effort: Pulmonary effort is normal. No respiratory distress.      Breath sounds: Normal breath sounds. No wheezing, rhonchi or rales.   Chest:      Chest wall: No tenderness.   Abdominal:      General: Bowel sounds are normal. There is no distension.      Palpations: Abdomen is soft. There is no mass.      Tenderness: There is no abdominal tenderness. There is no rebound.   Musculoskeletal:         General: No swelling or deformity. Normal range of motion.      Cervical back: Normal range of motion.      Right lower leg: No edema.      Left lower leg: No edema.   Skin:     General: Skin is warm and dry.      Capillary Refill: Capillary refill takes less than 2 seconds.   Neurological:      General: No focal deficit present.      Mental Status: He is alert and oriented to person, place, and time.      Cranial Nerves: No cranial nerve deficit.   Psychiatric:         Mood and Affect: Mood normal.         Behavior: Behavior normal.         Assessment/Plan   Principal Problem:    NSTEMI (non-ST elevated myocardial infarction) (Multi)    Acute pulmonary embolism  Patient is continued on IV heparin  And morphine for chest pressure or shortness of breath  We will follow-up echocardiogram  PERT team was contacted from the emergency department no intervention warranted but can be recontacted if need be    Elevated troponin  Likely secondary to the pulmonary embolism  Troponins are currently downtrending  Cardiology consulted, echocardiogram pending    Hypertension  Continue patient's losartan  Patient recently changed from amlodipine to losartan          No new Assessment & Plan notes have been filed under this hospital service since the last note was generated.  Service: Internal Medicine          Krzysztof Mcadams, APRN-CNP    Dragon dictation software was used to dictate this note and thus there may be minor errors in translation/transcription including garbled speech or misspellings. Please  contact for clarification if needed.

## 2024-08-11 NOTE — PROGRESS NOTES
Julio Cesar Casey is a 54 y.o. male admitted for NSTEMI (non-ST elevated myocardial infarction) ([facility]). Pharmacy reviewed the patient's [id number] medications and allergies for accuracy.    The list below reflects the PTA list prior to pharmacy medication history. A summary a changes to the PTA medication list has been listed below. Please review each medication in order reconciliation for additional clarification and justification.    Source of information:  [facility]    Medications added:  LOSARTAN 50MG 1 QD    Medications modified:    Medications to be removed:    Medications of concern:      Prior to Admission Medications   Prescriptions Last Dose Informant Patient Reported? Taking?   amLODIPine (Norvasc) 10 mg tablet   No No   Sig: Take 1 tablet (10 mg) by mouth once daily.      Facility-Administered Medications: None       Nena Bermeo

## 2024-08-11 NOTE — CONSULTS
"[facility]    Patient Name: Julio Cesar Casey  Patient : 1970  Room/Bed: 16/[facility]    Date: 24  Time: 11:31 AM    Referred by Dr. Montanez ref. provider found for Chest Pain (Mid sternal chest \"HEAVY\" since yesterday when eating and drinking. Unable to sleep last night due to discomfort. \"Hard to take a deep breath\")     History Of Present Illness:    Julio Cesar Casey is a 54 y.o. male who presented to the emergency department with shortness of breath and chest heaviness.  Patient states that his symptoms started yesterday around 5 PM.  He states that he was uncomfortable throughout the evening and had difficult time trying to sleep.  Given his persistent symptoms he came to the emergency department for additional evaluation.  BMP shows normal serum sodium and potassium with a serum creatinine of 1.13.  BNP was 77.  Troponin was 251-244.  INR was 1.0.  CBC showed hemoglobin of 17.8.  Chest x-ray showed no acute cardiopulmonary process.  CTA of the chest showed extensive PE with RV strain.  Prior heart catheterization done in 2023 showed no obstructive CAD.  During my exam the patient was resting comfortably in bed.    Assessment/Plan:   1.  Pulmonary embolism  Patient presented with shortness of breath and chest heaviness.  CTA of the chest showed saddle PE with evidence for RV strain.  Patient has had stable blood pressures and heart rate.  PERT team contacted recommended admission at [facility], lower extremity ultrasound, stat echocardiogram, lactate, and BNP.  Patient has been started on a heparin infusion for anticoagulation.    2.  Hypertension  The patient has a history of hypertension was treated with amlodipine as an outpatient.  Blood pressures under moderate control.    3.  Dyslipidemia  Prior cardiac catheterization in 2023 showed no obstructive CAD.  Statin therapy previously recommended but patient reportedly reluctant to take statin therapy.  Dietary/lifestyle " recommendations discussed.    Past Medical History:  He has no past medical history on file.  Hypertension, dyslipidemia    Past Surgical History:  He has no past surgical history on file.  None per patient      Social History:  He reports that he has never smoked. He has never used smokeless tobacco. He reports current alcohol use. He reports that he does not use drugs.    Family History:  No family history on file.  Father with colon cancer, mother with breast cancer     Allergies:  Penicillins    Outpatient Medications:  Current Outpatient Medications   Medication Instructions    amLODIPine (NORVASC) 10 mg, oral, Daily    losartan (COZAAR) 50 mg, oral, Daily        ROS:  A 14 point review of systems was done and is negative other than as stated in HPI    Vitals:  Vitals:    08/11/24 0805 08/11/24 0930 08/11/24 1030 08/11/24 1100   BP: 138/87 (!) 135/106 (!) 138/107 (!) 148/97   BP Location: Left arm      Patient Position: Sitting      Pulse: 98 90 93 90   Resp: 20 16 16 16   Temp: 36.4 °C (97.6 °F)      TempSrc: Tympanic      SpO2: 98% 95% 98% 97%   Weight: 94.3 kg (208 lb)      Height: 1.829 m (6')          Physical Exam:     Constitutional: Cooperative, in no acute distress, alert, appears stated age.  Skin: Skin color, texture, turgor normal. No rashes or lesions.  Head: Normocephalic. No masses, lesions, tenderness or abnormalities  Eyes: Extraocular movements are grossly intact.  Mouth and throat: Mucous membranes moist  Neck: Neck supple, no carotid bruits, no JVD  Respiratory: Lungs clear to auscultation, no wheezing or rhonchi, no use of accessory muscles  Chest wall: No scars, normal excursion with respiration  Cardiovascular: Regular rhythm without murmur  Gastrointestinal: Abdomen soft, nontender. Bowel sounds normal.  Musculoskeletal: Strength equal in upper extremities  Extremities: No pitting edema  Neurologic: Sensation grossly intact, alert and oriented ×3    Intake/Output:   No intake/output data  recorded.    Outpatient Medications  No current facility-administered medications on file prior to encounter.     Current Outpatient Medications on File Prior to Encounter   Medication Sig Dispense Refill    amLODIPine (Norvasc) 10 mg tablet Take 1 tablet (10 mg) by mouth once daily. 90 tablet 0    losartan (Cozaar) 50 mg tablet Take 1 tablet (50 mg) by mouth once daily.         Scheduled medications     Continuous medications  heparin, 0-4,000 Units/hr, Last Rate: 1,000 Units/hr (08/11/24 0958)      PRN medications  PRN medications: heparin   (Not in a hospital admission)      Recent Labs: (past 2 days)  Recent Results (from the past 48 hour(s))   CBC and Auto Differential    Collection Time: 08/11/24  8:38 AM   Result Value Ref Range    WBC 8.9 4.4 - 11.3 x10*3/uL    nRBC 0.0 0.0 - 0.0 /100 WBCs    RBC 5.62 4.50 - 5.90 x10*6/uL    Hemoglobin 17.8 (H) 13.5 - 17.5 g/dL    Hematocrit 51.3 41.0 - 52.0 %    MCV 91 80 - 100 fL    MCH 31.7 26.0 - 34.0 pg    MCHC 34.7 32.0 - 36.0 g/dL    RDW 14.4 11.5 - 14.5 %    Platelets 208 150 - 450 x10*3/uL    Neutrophils % 72.9 40.0 - 80.0 %    Immature Granulocytes %, Automated 0.4 0.0 - 0.9 %    Lymphocytes % 18.2 13.0 - 44.0 %    Monocytes % 7.5 2.0 - 10.0 %    Eosinophils % 0.8 0.0 - 6.0 %    Basophils % 0.2 0.0 - 2.0 %    Neutrophils Absolute 6.47 1.20 - 7.70 x10*3/uL    Immature Granulocytes Absolute, Automated 0.04 0.00 - 0.70 x10*3/uL    Lymphocytes Absolute 1.62 1.20 - 4.80 x10*3/uL    Monocytes Absolute 0.67 0.10 - 1.00 x10*3/uL    Eosinophils Absolute 0.07 0.00 - 0.70 x10*3/uL    Basophils Absolute 0.02 0.00 - 0.10 x10*3/uL   Comprehensive Metabolic Panel    Collection Time: 08/11/24  8:38 AM   Result Value Ref Range    Glucose 112 (H) 74 - 99 mg/dL    Sodium 136 136 - 145 mmol/L    Potassium 4.1 3.5 - 5.3 mmol/L    Chloride 102 98 - 107 mmol/L    Bicarbonate 27 21 - 32 mmol/L    Anion Gap 11 10 - 20 mmol/L    Urea Nitrogen 15 6 - 23 mg/dL    Creatinine 1.13 0.50 - 1.30  mg/dL    eGFR 77 >60 mL/min/1.73m*2    Calcium 9.7 8.6 - 10.3 mg/dL    Albumin 4.4 3.4 - 5.0 g/dL    Alkaline Phosphatase 45 33 - 120 U/L    Total Protein 7.8 6.4 - 8.2 g/dL    AST 14 9 - 39 U/L    Bilirubin, Total 0.7 0.0 - 1.2 mg/dL    ALT 17 10 - 52 U/L   Magnesium    Collection Time: 08/11/24  8:38 AM   Result Value Ref Range    Magnesium 2.06 1.60 - 2.40 mg/dL   Coagulation Screen    Collection Time: 08/11/24  8:38 AM   Result Value Ref Range    Protime 11.2 9.8 - 12.8 seconds    INR 1.0 0.9 - 1.1    aPTT 31 27 - 38 seconds   Troponin I, High Sensitivity, Initial    Collection Time: 08/11/24  8:38 AM   Result Value Ref Range    Troponin I, High Sensitivity 251 (HH) 0 - 20 ng/L   Troponin, High Sensitivity, 1 Hour    Collection Time: 08/11/24  9:26 AM   Result Value Ref Range    Troponin I, High Sensitivity 244 (HH) 0 - 20 ng/L   B-type natriuretic peptide    Collection Time: 08/11/24 10:07 AM   Result Value Ref Range    BNP 77 0 - 99 pg/mL   Lactate    Collection Time: 08/11/24 10:56 AM   Result Value Ref Range    Lactate 1.0 0.4 - 2.0 mmol/L       CV Studies:  EKG:No results found for this or any previous visit (from the past 4464 hour(s)).  Echocardiogram: No results found for this or any previous visit from the past 1825 days.    Stress Testing IMGRESULT(OSW6907:1:1825): No results found for this or any previous visit from the past 1825 days.    Cardiac Catheterization:   Adult Cath     [facility], Cath Lab  [address] [place] [address]  Phone [phone] Fax [phone]    Cardiovascular Catheterization Report    Patient Name:     IDALIA EDWARDS Performing Physician: [id number] Alex MD  Study Date:       8/28/2023  Verifying Physician:  Germania Alex MD  MRN/PID:          [id number]   Cardiologist:  Accession/Order#: [id number]  Referring Physician:  VADIM ALEX  YOB: 1970  Referring Physician:  Gender:           M          Referring  Physician:  Admit Date:                  Fellow:               [id number] Yanely Gaam MD      Study: Left Heart Catheterization      Indications:  IDALIA EDWARDS is a 53 year old male who presents with hypertension and a chest pain assessment of typical angina. Suspected coronary artery disease.  Stress test performed: Yes. Stress test type: Stress Echocardiogram. Stress  result: Positive. Ischemic Risk: Intermediate. CTA performed: No. Juli  accessed: No. LVEF Assessed: Yes. LVEF = 60%.  Frailty status of patient entering lab: 2 = Well - no active disease symptoms.      Procedure Description:  After infiltration with 2% Lidocaine, the right radial artery was cannulated with a modified Seldinger technique. Subsequently a 6 [place] citizen sheath was placed in the right radial artery. Selective coronary catheterization was performed using a 6 Fr catheter(s) exchanged over a guide wire to cannulate the coronary arteries. A JR 4 tip catheter was used for right coronary injections.  Additional catheter(s) used to visualize the coronary arteries were: TIG4.0. Multiple injections of contrast were made into the left and right coronary arteries with angiograms recorded in multiple projections. After completion of the procedure, the arterial sheath was pulled and a TR Band Radial Compression Device was utilized to obtain patent hemostasis.    Coronary Angiography:  The coronary circulation is right dominant.    Left Main Coronary Artery:  The left main coronary artery is a normal caliber vessel. The left main arises normally from the left coronary sinus of Valsalva and bifurcates into the LAD and circumflex coronary arteries. The left main coronary artery showed no significant disease or stenosis greater than 30%.    Left Anterior Descending Coronary Artery Distribution:  The left anterior descending coronary artery is a normal caliber vessel. The LAD arises normally from the left main coronary artery. The LAD demonstrated no  significant disease or stenosis greater than 30%.    Circumflex Coronary Artery Distribution:  The circumflex coronary artery is a normal caliber vessel. The circumflex arises normally from the left main coronary artery and terminates in the AV groove. The circumflex revealed no significant disease or stenosis greater than 30%. The 1st obtuse marginal branch is a normal caliber vessel. The 1st obtuse marginal branch showed no significant disease or stenosis greater than 30%.    Right Coronary Artery Distribution:    The right coronary artery is a normal caliber vessel. The RCA arises normally from the right sinus of Valsalva. The RCA showed mild atherosclerotic disease. The right posterolateral branch is a medium-sized caliber vessel. The right posterolateral branch showed no significant disease or stenosis greater than 30%. The right posterior descending artery is a medium-sized caliber vessel. The right posterior descending artery showed no significant disease or stenosis greater than 30%.      Left Ventriculography:  LVEDP= 5mmHg.    Hemo Personnel:  +-------------------------+-------------+  Name                     Duty           +-------------------------+-------------+  Javier Gerardo MD            PROC MD 1  +-------------------------+-------------+  Mago PhelanR      PROC SCRUB 1  +-------------------------+-------------+  Emma Joseph RN           PROC CIRC 1  +-------------------------+-------------+  Alina Reddy RN  PROC CIRC 2  +-------------------------+-------------+  Yolanda Dawson RN      PROC RECORD 1  +-------------------------+-------------+  Maricel Monsivais RN     PROC RECORD 2  +-------------------------+-------------+  Vijaya Warren RN           PROC NURSE 1  +-------------------------+-------------+  Roxanne Adan RN          PROC NURSE 2  +-------------------------+-------------+      Sedation  "Time:  +------------------------+----------------------------------------+  Sedation Start/End TimesTime                                      +------------------------+----------------------------------------+  Start                   8/28/2023 09:48:10                        +------------------------+----------------------------------------+  Drugs                   Versed 1mg IV per physician for sedation  +------------------------+----------------------------------------+  End                     8/28/2023 10:11:51                        +------------------------+----------------------------------------+      Equipment Used:  +---------------------+--------------------------------------------------------+              Date/Time                                             Description  +---------------------+--------------------------------------------------------+ 8/28/2023 9:40:04 AM     - Capnoflex LF Oral/Nasal CO2 - Qty: 1                                                               Each Part #: [id number]  +---------------------+--------------------------------------------------------+ 8/28/2023 9:42:01 AM    - Cath 6FR Tiger 4.0 Optitorque -                                                         Qty: 1 Each Part #: 60  +---------------------+--------------------------------------------------------+ 8/28/2023 9:42:10 AM    - Ledy Nolasco 6 FR x 10cm -                                                         Qty: 1 Each Part #: 13  +---------------------+--------------------------------------------------------+   8/28/2023 9:42:15 AM    [employer] Guidewire 3mm J                                          210 cm .035 - Qty: 1 Each Part #: [id number]  +---------------------+--------------------------------------------------------+   8/28/2023 9:52:53 AM  Abbott Hi-Torque Versacore Modified J                                       0.035\" x 145cm - Qty: 1 Each " Part #: [id number]  +---------------------+--------------------------------------------------------+  8/28/2023 10:01:51 AM      Medtronic DXTERITY JR 4.0 5FR X                                           100CM - Qty: 1 Each Part #: [id number]  +---------------------+--------------------------------------------------------+      +----------+  Contrast:   +----------+  Omnipaque:  +----------+      Hemodynamic Pressures:    +----+---------------+----------+-------------+--------------+-------+---------+  Site   Date Time   Phase NameSystolic mmHgDiastolic mmHgED mmHgMean mmHg  +----+---------------+----------+-------------+--------------+-------+---------+    AO      8/28/2023  AIR REST          133            87             110           9:58:34 AM                                                       +----+---------------+----------+-------------+--------------+-------+---------+    LV      8/28/2023  AIR REST          130            -6      5                   10:08:01 AM                                                       +----+---------------+----------+-------------+--------------+-------+---------+    LV      8/28/2023  AIR REST          138            -6      7                   10:08:13 AM                                                       +----+---------------+----------+-------------+--------------+-------+---------+   LVp      8/28/2023  AIR REST          137            -8      9                   10:08:27 AM                                                       +----+---------------+----------+-------------+--------------+-------+---------+   AOp      8/28/2023  AIR REST          121            71              91          10:08:34 AM                                                       +----+---------------+----------+-------------+--------------+-------+---------+      Complications:  No in-lab complications  observed.    Cardiac Cath Transition of Care Summary:  Post Procedure Diagnosis: Mild CAD.  Blood Loss:               Estimated blood loss during the procedure was 0 mls.  Specimens Removed:        Number of specimen(s) removed: none.      Recommendations:  Maximize medical therapy.  Agressive risk factor modification efforts.  Follow-up with cardiology clinic.  Medical management of coronary artery disease.    ____________________________________________________________________________________  CONCLUSIONS:  1. Left heart cath in right dominant system showed non obstructive coronary artery disease.  2. LVEDP=9 mmHg.    ____________________________________________________________________________________  CPT Codes:  Left Heart Cath (visualization of coronaries) and LV-45458; Moderate Sedation Services initial 15 minutes patient >5 years-12905; Moderate Sedation Services 1st additional 15 minutes patient >5 years-12377    ICD 10 Codes:  R06.02-Shortness of breath    [id number] Javier Alex MD  Performing Physician  Electronically signed by [id number] Javier Alex MD on 8/28/2023 at 1:00:43 PM      cc Report to: JAVIER ALEX           Final   No results found for this or any previous visit from the past 3650 days.     Cardiac Scoring: No results found for this or any previous visit from the past 1825 days.    AAA : No results found for this or any previous visit from the past 1825 days.    OTHER: No results found for this or any previous visit from the past 1825 days.    LAST IMAGING RESULTS  CT angio chest for pulmonary embolism  Narrative: Interpreted By:  Schoenberger, Joseph,   STUDY:  CT ANGIO CHEST FOR PULMONARY EMBOLISM;  8/11/2024 9:47 am      INDICATION:  Signs/Symptoms:Chest tightness, shortness of breath with exertion.      COMPARISON:  None.      ACCESSION NUMBER(S):  [id number]      ORDERING CLINICIAN:  FARHAD SANTOS      TECHNIQUE:  Helical data acquisition of the chest was obtained with the  intravenous  injection of 75 cc Omnipaque 350.. Images were  reformatted in coronal and sagittal planes. Axial and coronal MIP  images were created and reviewed.      FINDINGS:  POTENTIAL LIMITATIONS OF THE STUDY: None      HEART AND VESSELS:  There is a large pulmonary embolus salvage over the bifurcation of  the main pulmonary artery extending into the right left pulmonary  arteries. Additionally emboli extend into the lobar branches  partially occlusive in the right lower lobe. Peripheral flow is  identified.      Main pulmonary artery and its branches are normal in caliber.      The thoracic aorta is normal in course and caliber opacify normally  without significant atherosclerotic calcifications and no evidence  for dissection.      No coronary artery calcifications are seen.The study is not optimized  for evaluation of coronary arteries.      The cardiac chambers are not enlarged. However, there is a reversal  of the right ventricular left ventricular diameter ratio with slight  bowing of the intraventricular septum suggesting right ventricular  strain.      No evidence of pericardial effusion.      MEDIASTINUM AND MIKAELA, LOWER NECK AND AXILLA:  The thyroid appears normal.      There is no thoracic lymphadenopathy.      The esophagus appears normal.      LUNGS AND AIRWAYS:  The trachea and central airways are patent. No endobronchial lesion.      There is no pleural effusion, pneumothorax, or airspace opacity.      UPPER ABDOMEN:  Dense calcification in the gallbladder neck. No hydrops, wall  thickening, or pericholecystic inflammatory findings. Otherwise  unremarkable.      CHEST WALL AND OSSEOUS STRUCTURES:  There are no suspicious osseous lesions. Multilevel degenerative  changes are present      Impression: 1.  Extensive pulmonary emboli with right ventricular strain as  detailed above.  2. Cholelithiasis without acute cholecystitis.          MACRO:  None      Signed by: Joseph Schoenberger 8/11/2024 9:56 AM  Dictation  workstation:   [id number]  XR chest 1 view  Narrative: Interpreted By:  Schoenberger, Joseph,   STUDY:  XR CHEST 1 VIEW;  8/11/2024 9:01 am      INDICATION:  Signs/Symptoms:Chest Pain.      COMPARISON:  08/17/2023      ACCESSION NUMBER(S):  [id number]      ORDERING CLINICIAN:  FARHAD SANTOS      FINDINGS:                  CARDIOMEDIASTINAL SILHOUETTE:  Cardiomediastinal silhouette is normal in size and configuration.      LUNGS:  Lungs are clear.      ABDOMEN:  No remarkable upper abdominal findings.      BONES:  No acute osseous changes.      Impression: 1.  No evidence of acute cardiopulmonary process.              MACRO:  None      Signed by: Joseph Schoenberger 8/11/2024 9:06 AM  Dictation workstation:   [id number]        Rodney Sanford DO, FACC, CHELLY  8/11/2024  11:31 AM

## 2024-08-11 NOTE — SIGNIFICANT EVENT
PULMONARY EMBOLISM RESPONSE TEAM (PERT) NOTE    This note represents a summary of a virtual evaluation by the pulmonary embolism response team requested by ED physician at [facility].  Suggestions and impression are summarized from the initial virtual encounter and discussion with the referring medical team. These suggestions supplement but should not be a substitute for bedside evaluation and management by the attending of record.     PERT Members on the Call:  Critical Care Attending: Dr. Benji KELLY Interventional Cardiology Attending: Dr. Callum Tapia  Vascular Medicine Attending: Dr. Ordaz  Critical Care Fellow: Dr. Martin    Brief Clinical Summary:  Julio Cesar Casey is a 54 y.o. male presenting with shortness of breath since last night. Was out at dinner with wife then went on short walk and was feeling more dyspneic. This morning still feeling SOB so went to ED for evaluation. CTPE performed which showed extensive pulmonary emboli including saddle PE. RV/LV 2/1    Vital Signs  BP (!) 135/106   Pulse 90   Temp 36.4 °C (97.6 °F) (Tympanic)   Resp 16   Ht 1.829 m (6')   Wt 94.3 kg (208 lb)   SpO2 95%   BMI 28.21 kg/m²      Laboratory  Recent Labs     08/11/24  0926 08/11/24  0838 08/17/23  1427 08/17/23  1306   TROPHS 244* 251* 4 5   BNP  --   --   --  8         PESI Score Bandar KIM Et al. Arch Intern Med. 2010;170:[id number].           PESI Score:  85, consistent with JLPESIRISK: Class II, or Low risk (1.7-3.5% 30-day mortality risk) PE.    CT Scan reviewed:  Clot burden extensive, present in bilateral main pulmonary arteries and saddle PE  RV/LV ratio 2/1 by CT scan    TTE reviewed (if available):  N/A    Venous duplex (if available):  N/A      Contraindications to anticoagulation: none  Contraindications to thrombolytics: none    Initial Impressions:  ERS/ESC Pulmonary Embolism Risk Category: JLPECLASS: Intermediate-low risk.  Submassive Saddle PE    Initial Suggestions/Plans:  Admit to telemetry  unit at  [facility] (SDU or telemetry floor)  Initial therapy suggested: heparin gtt.  Additional testing recommended: TTE stat, LE DVT US bilaterally, lactate, BNP  Consults suggested: cardiology consult.  Additional suggestions for re-evaluation/reconference or retesting: if worsening hemodynamics re page PERT team.    To re conference the PERT team please call the  Transfer Center at [phone].

## 2024-08-11 NOTE — CARE PLAN
The patient's goals for the shift include able to teto. increased activity without sob    The clinical goals for the shift include pt will remain clinically and hemodynamically stable this shift    Over the shift, the patient did not make progress toward the following goals.

## 2024-08-11 NOTE — ED PROVIDER NOTES
"EMERGENCY MEDICINE EVALUATION NOTE    History of Present Illness     Chief Complaint:   Chief Complaint   Patient presents with    Chest Pain     Mid sternal chest \"HEAVY\" since yesterday when eating and drinking. Unable to sleep last night due to discomfort. \"Hard to take a deep breath\"       HPI: Julio Cesar Casey is a 54 y.o. male presents with a chief complaint of midsternal heaviness and shortness of breath since yesterday.  He reports that last night while out with his wife after eating dinner he was go to the bathroom he started become short of breath.  He states that anytime that he exerts himself or walks he becomes very short of breath.  Patient denies any history of any cardiac disease.  He states that he had a cardiac catheter that did not show any acute abnormalities.  Patient reports that the only medicine he is taking at home currently is his blood pressure medication.  He states they recently switched him from amlodipine to losartan.  He states that throughout the night he tried to sleep and his chest discomfort never went away so he came in to be evaluated today.  He states that it is only relieved there when he is exerting himself.  He states as long as he is lying still he feels relatively normal.  He states that when he lays completely flat though he does become slightly short of breath and his discomfort increases so he slept in his chair sitting up overnight.    Previous History   History reviewed. No pertinent past medical history.  History reviewed. No pertinent surgical history.  Social History     Tobacco Use    Smoking status: Never    Smokeless tobacco: Never   Substance Use Topics    Alcohol use: Yes    Drug use: Never     No family history on file.  Allergies   Allergen Reactions    Penicillins Rash     Per allergy testing     Current Outpatient Medications   Medication Instructions    amLODIPine (NORVASC) 10 mg, oral, Daily       Physical Exam     Appearance: Alert, oriented , cooperative,  " in no acute distress.      Skin: Intact,  dry skin, no lesions, rash, petechiae or purpura.      Eyes: PERRLA, EOMs intact,  Conjunctiva pink      ENT: Hearing grossly intact. Pharynx clear     Neck: Supple. Trachea at midline.      Pulmonary: Clear bilaterally. No rales, rhonchi or wheezing. No accessory muscle use or stridor.     Cardiac: Normal rate and rhythm without murmur     Abdomen: Soft, nontender, active bowel sounds.     Musculoskeletal: Full range of motion.      Neurological:Cranial nerves II through XII are grossly intact, normal sensation, no weakness, no focal findings identified.     Results     Labs Reviewed   CBC WITH AUTO DIFFERENTIAL - Abnormal       Result Value    WBC 8.9      nRBC 0.0      RBC 5.62      Hemoglobin 17.8 (*)     Hematocrit 51.3      MCV 91      MCH 31.7      MCHC 34.7      RDW 14.4      Platelets 208      Neutrophils % 72.9      Immature Granulocytes %, Automated 0.4      Lymphocytes % 18.2      Monocytes % 7.5      Eosinophils % 0.8      Basophils % 0.2      Neutrophils Absolute 6.47      Immature Granulocytes Absolute, Automated 0.04      Lymphocytes Absolute 1.62      Monocytes Absolute 0.67      Eosinophils Absolute 0.07      Basophils Absolute 0.02     COMPREHENSIVE METABOLIC PANEL - Abnormal    Glucose 112 (*)     Sodium 136      Potassium 4.1      Chloride 102      Bicarbonate 27      Anion Gap 11      Urea Nitrogen 15      Creatinine 1.13      eGFR 77      Calcium 9.7      Albumin 4.4      Alkaline Phosphatase 45      Total Protein 7.8      AST 14      Bilirubin, Total 0.7      ALT 17     SERIAL TROPONIN-INITIAL - Abnormal    Troponin I, High Sensitivity 251 (*)     Narrative:     Less than 99th percentile of normal range cutoff-  Female and children under 18 years old <14 ng/L; Male <21 ng/L: Negative  Repeat testing should be performed if clinically indicated.     Female and children under 18 years old 14-50 ng/L; Male 21-50 ng/L:  Consistent with possible cardiac  damage and possible increased clinical   risk. Serial measurements may help to assess extent of myocardial damage.     >50 ng/L: Consistent with cardiac damage, increased clinical risk and  myocardial infarction. Serial measurements may help assess extent of   myocardial damage.      NOTE: Children less than 1 year old may have higher baseline troponin   levels and results should be interpreted in conjunction with the overall   clinical context.     NOTE: Troponin I testing is performed using a different   testing methodology at [facility] than at other   [facility] hospitals. Direct result comparisons should only   be made within the same method.   SERIAL TROPONIN, 1 HOUR - Abnormal    Troponin I, High Sensitivity 244 (*)     Narrative:     Less than 99th percentile of normal range cutoff-  Female and children under 18 years old <14 ng/L; Male <21 ng/L: Negative  Repeat testing should be performed if clinically indicated.     Female and children under 18 years old 14-50 ng/L; Male 21-50 ng/L:  Consistent with possible cardiac damage and possible increased clinical   risk. Serial measurements may help to assess extent of myocardial damage.     >50 ng/L: Consistent with cardiac damage, increased clinical risk and  myocardial infarction. Serial measurements may help assess extent of   myocardial damage.      NOTE: Children less than 1 year old may have higher baseline troponin   levels and results should be interpreted in conjunction with the overall   clinical context.     NOTE: Troponin I testing is performed using a different   testing methodology at [facility] than at other   [facility]. Direct result comparisons should only   be made within the same method.   MAGNESIUM - Normal    Magnesium 2.06     COAGULATION SCREEN - Normal    Protime 11.2      INR 1.0      aPTT 31      Narrative:     The APTT is no longer used for monitoring Unfractionated Heparin Therapy. For monitoring Heparin  Therapy, use the Heparin Assay.   B-TYPE NATRIURETIC PEPTIDE - Normal    BNP 77      Narrative:        <100 pg/mL - Heart failure unlikely  100-299 pg/mL - Intermediate probability of acute heart                  failure exacerbation. Correlate with clinical                  context and patient history.    >=300 pg/mL - Heart Failure likely. Correlate with clinical                  context and patient history.    BNP testing is performed using different testing methodology at [facility] than at other [facility]. Direct result comparisons should only be made within the same method.      TROPONIN SERIES- (INITIAL, 1 HR)    Narrative:     The following orders were created for panel order Troponin I Series, High Sensitivity (0, 1 HR).  Procedure                               Abnormality         Status                     ---------                               -----------         ------                     Troponin I, High Sensiti...[[id number]]  Abnormal            Final result               Troponin, High Sensitivi...[[id number]]  Abnormal            Final result                 Please view results for these tests on the individual orders.   LACTATE     CT angio chest for pulmonary embolism   Final Result   1.  Extensive pulmonary emboli with right ventricular strain as   detailed above.   2. Cholelithiasis without acute cholecystitis.             MACRO:   None        Signed by: Joseph Schoenberger 8/11/2024 9:56 AM   Dictation workstation:   [id number]      XR chest 1 view   Final Result   1.  No evidence of acute cardiopulmonary process.                  MACRO:   None        Signed by: Joseph Schoenberger 8/11/2024 9:06 AM   Dictation workstation:   [id number]      Vascular US lower extremity venous duplex bilateral    (Results Pending)   Transthoracic Echo (TTE) Complete    (Results Pending)         ED Course & Medical Decision Making     Medications   heparin 25,000 Units in dextrose 5% 250  mL (100 Units/mL) infusion (premix) (1,000 Units/hr intravenous New Bag 8/11/24 0958)   heparin bolus from bag 2,000-4,000 Units (has no administration in time range)   aspirin chewable tablet 324 mg (324 mg oral Given 8/11/24 0958)   heparin bolus from bag 4,000 Units (4,000 Units intravenous Bolus from Bag 8/11/24 1013)   iohexol (OMNIPaque) 350 mg iodine/mL solution 75 mL (75 mL intravenous Given 8/11/24 0948)     Heart Rate:  [90-98]   Temperature:  [36.4 °C (97.6 °F)]   Respirations:  [16-20]   BP: (135-138)/()   Height:  [182.9 cm (6')]   Weight:  [94.3 kg (208 lb)]   Pulse Ox:  [95 %-98 %]    ED Course as of 08/11/24 1104   Sun Aug 11, 2024   0811 EKG performed 8/11/2024 at 8:11 AM.  Sinus rhythm with a ventricular rate of 9 6 bpm, parable of 106 ms, QT/QTc of 351/444 ms.  No STEMI. [CJ]   0944 Discussed the case with Dr. Sanford the on-call cardiologist.  He agrees to starting patient on heparin, admission, and consult placed to cardiology. [CJ]   1005 Spoke to the on-call MICU fellow Dr. Martin for discussion with PERT team.  We discussed the case.  She states that they would patient and then call back with decisions. [CJ]   1007 Troponin I, High Sensitivity(!!): 244 [CJ]   1007 Delta Trop stable [CJ]   1021 Discussed the case with the MICU fellow again Dr. Martin.  She discussed with PERT team.  They think that since the patient is so stable that he can be admitted here.  They do recommend DVT ultrasounds as well as echo during inpatient workup.  They state if anything else were to be abnormal they can be contacted again. [CJ]   1039 Updated Dr. Sanford about the patient.  I informed him we will be placing a cardiac consult patient on PERT team recommendation.  Ultrasound of legs was ordered at this time. [CJ]   1103 Hospitalist ALFONSO agreed to admit under Dr. Rivero.  [CJ]      ED Course User Index  [CJ] Eyad Singh PA-C         Diagnoses as of 08/11/24 1104   NSTEMI (non-ST elevated myocardial infarction)  (Multi)   Acute saddle pulmonary embolism, unspecified whether acute cor pulmonale present (Multi)       Procedures   Procedures    Diagnosis     1. NSTEMI (non-ST elevated myocardial infarction) (Multi)    2. Acute saddle pulmonary embolism, unspecified whether acute cor pulmonale present (Multi)        Disposition   Admit     ED Prescriptions    None         Disclaimer: This note was dictated by speech recognition. Minor errors in transcription may be present. Please call if questions.       Eyad Singh PA-C  08/11/24 1391

## 2024-08-12 ENCOUNTER — APPOINTMENT (OUTPATIENT)
Dept: CARDIOLOGY | Facility: HOSPITAL | Age: 54
End: 2024-08-12
Payer: COMMERCIAL

## 2024-08-12 LAB
ANION GAP SERPL CALC-SCNC: 12 MMOL/L (ref 10–20)
BUN SERPL-MCNC: 19 MG/DL (ref 6–23)
CALCIUM SERPL-MCNC: 8.9 MG/DL (ref 8.6–10.3)
CHLORIDE SERPL-SCNC: 104 MMOL/L (ref 98–107)
CO2 SERPL-SCNC: 26 MMOL/L (ref 21–32)
CREAT SERPL-MCNC: 1.2 MG/DL (ref 0.5–1.3)
EGFRCR SERPLBLD CKD-EPI 2021: 71 ML/MIN/1.73M*2
ERYTHROCYTE [DISTWIDTH] IN BLOOD BY AUTOMATED COUNT: 14 % (ref 11.5–14.5)
GLUCOSE SERPL-MCNC: 105 MG/DL (ref 74–99)
HCT VFR BLD AUTO: 47.1 % (ref 41–52)
HGB BLD-MCNC: 16.2 G/DL (ref 13.5–17.5)
MCH RBC QN AUTO: 31.3 PG (ref 26–34)
MCHC RBC AUTO-ENTMCNC: 34.4 G/DL (ref 32–36)
MCV RBC AUTO: 91 FL (ref 80–100)
NRBC BLD-RTO: 0 /100 WBCS (ref 0–0)
PLATELET # BLD AUTO: 199 X10*3/UL (ref 150–450)
POTASSIUM SERPL-SCNC: 3.8 MMOL/L (ref 3.5–5.3)
RBC # BLD AUTO: 5.17 X10*6/UL (ref 4.5–5.9)
SODIUM SERPL-SCNC: 138 MMOL/L (ref 136–145)
UFH PPP CHRO-ACNC: 0.3 IU/ML
UFH PPP CHRO-ACNC: 0.3 IU/ML
WBC # BLD AUTO: 6.3 X10*3/UL (ref 4.4–11.3)

## 2024-08-12 PROCEDURE — 80048 BASIC METABOLIC PNL TOTAL CA: CPT | Performed by: NURSE PRACTITIONER

## 2024-08-12 PROCEDURE — 85520 HEPARIN ASSAY: CPT | Performed by: PHYSICIAN ASSISTANT

## 2024-08-12 PROCEDURE — 2060000001 HC INTERMEDIATE ICU ROOM DAILY

## 2024-08-12 PROCEDURE — 2500000004 HC RX 250 GENERAL PHARMACY W/ HCPCS (ALT 636 FOR OP/ED): Performed by: PHYSICIAN ASSISTANT

## 2024-08-12 PROCEDURE — 99232 SBSQ HOSP IP/OBS MODERATE 35: CPT | Performed by: PHYSICIAN ASSISTANT

## 2024-08-12 PROCEDURE — 2500000001 HC RX 250 WO HCPCS SELF ADMINISTERED DRUGS (ALT 637 FOR MEDICARE OP): Performed by: NURSE PRACTITIONER

## 2024-08-12 PROCEDURE — 99233 SBSQ HOSP IP/OBS HIGH 50: CPT | Performed by: PHYSICIAN ASSISTANT

## 2024-08-12 PROCEDURE — 36415 COLL VENOUS BLD VENIPUNCTURE: CPT | Performed by: NURSE PRACTITIONER

## 2024-08-12 PROCEDURE — 85027 COMPLETE CBC AUTOMATED: CPT | Performed by: NURSE PRACTITIONER

## 2024-08-12 PROCEDURE — 36415 COLL VENOUS BLD VENIPUNCTURE: CPT | Performed by: PHYSICIAN ASSISTANT

## 2024-08-12 RX ADMIN — PANTOPRAZOLE SODIUM 40 MG: 40 TABLET, DELAYED RELEASE ORAL at 04:40

## 2024-08-12 RX ADMIN — LOSARTAN POTASSIUM 50 MG: 50 TABLET, FILM COATED ORAL at 10:02

## 2024-08-12 RX ADMIN — HEPARIN SODIUM 1200 UNITS/HR: 10000 INJECTION, SOLUTION INTRAVENOUS at 01:13

## 2024-08-12 RX ADMIN — HEPARIN SODIUM 1200 UNITS/HR: 10000 INJECTION, SOLUTION INTRAVENOUS at 23:08

## 2024-08-12 ASSESSMENT — ENCOUNTER SYMPTOMS
WEAKNESS: 0
CHEST TIGHTNESS: 0
JOINT SWELLING: 0
CONSTIPATION: 0
BLOOD IN STOOL: 0
DIARRHEA: 0
WHEEZING: 0
HEADACHES: 0
DIAPHORESIS: 0
NAUSEA: 0
CHILLS: 0
EYE PAIN: 0
WOUND: 0
BRUISES/BLEEDS EASILY: 0
FREQUENCY: 0
COUGH: 0
FLANK PAIN: 0
NUMBNESS: 0
LIGHT-HEADEDNESS: 0
FACIAL SWELLING: 0
TROUBLE SWALLOWING: 0
DIZZINESS: 0
HEMATURIA: 0
SHORTNESS OF BREATH: 0
HALLUCINATIONS: 0
ORTHOPNEA: 0
FEVER: 0
FATIGUE: 0
BACK PAIN: 0
PALPITATIONS: 0
ABDOMINAL PAIN: 0
DYSURIA: 0
SORE THROAT: 0
VOMITING: 0
APPETITE CHANGE: 0

## 2024-08-12 ASSESSMENT — PAIN - FUNCTIONAL ASSESSMENT
PAIN_FUNCTIONAL_ASSESSMENT: 0-10
PAIN_FUNCTIONAL_ASSESSMENT: 0-10

## 2024-08-12 ASSESSMENT — PAIN SCALES - GENERAL
PAINLEVEL_OUTOF10: 0 - NO PAIN
PAINLEVEL_OUTOF10: 0 - NO PAIN

## 2024-08-12 ASSESSMENT — ACTIVITIES OF DAILY LIVING (ADL): LACK_OF_TRANSPORTATION: NO

## 2024-08-12 NOTE — CARE PLAN
The patient's goals for the shift include able to teto. increased activity without sob    The clinical goals for the shift include pt will remain clinically and hemodynamically stable this shift    Over the shift, the patient did not make progress toward the following goals. Barriers to progression include SOB and CP  with exertion.

## 2024-08-12 NOTE — PROGRESS NOTES
Julio Cesar Casey is a 54 y.o. male on day 1 of admission presenting with NSTEMI (non-ST elevated myocardial infarction) (Multi).      Subjective   Julio Cesar Casey is a 54 y.o. male with PMHx s/f HTN, nonobstructive CAD presented 8/12/24 with Chest pain. The patient presented to emergency department with complaint of heaviness in the chest shortness of breath some chest discomfort starting yesterday.  Patient states that symptoms were worse when he gets up and moves he even just going to the bathroom and back patient had developed some shortness of breath and chest heaviness.  At rest the patient is not having any chest heaviness.  Patient reports a prior history of cardiac cath they told him he had only like 10% blockage.  Patient has not had any recent surgery he did have a trip to [place] and back in June.  He denies any fevers chills rigors any purulent sputum any hemoptysis any leg pain. Troponin initially 250 1 repeat troponin 244 BN peptide 77 lactate 1.0. EKG showing sinus rhythm no intraventricular conduction delay no ST elevation. CTA of the chest showed saddle PE with evidence for RV strain.  The ED provider contacted the PERT team.  Given that patient was normotensive heart rate was around 100 was advised to check additional lab studies which have been relatively unremarkable.  Patient was felt not to need emergent intervention from the PERT team. Echo: EF 61%, severely reduced right ventricular systolic function, RV free wall akinesis with sparing of the apex, RV free wall strain significantly reduced at -3.4%, mildly enlarged right ventricle. US BLE without DVT    8/12/2024: No acute events overnight. Vitals stable, BP accelerated overnight but 111/79 this morning, remains 95% on RA, HR 78. Labs largely unremarkable.    Will have the PERT team review again today to see if any further intervention is necessary other than heparin given echo results, patient is hemodynamically stable and states he feels better today,  already walked around the room this morning and had no dyspnea on exertion         Review of Systems   Constitutional:  Negative for appetite change, chills, diaphoresis, fatigue and fever.   HENT:  Negative for congestion, ear pain, facial swelling, hearing loss, nosebleeds, sore throat, tinnitus and trouble swallowing.    Eyes:  Negative for pain.   Respiratory:  Negative for cough, chest tightness, shortness of breath and wheezing.    Cardiovascular:  Negative for chest pain, palpitations and leg swelling.   Gastrointestinal:  Negative for abdominal pain, blood in stool, constipation, diarrhea, nausea and vomiting.   Genitourinary:  Negative for dysuria, flank pain, frequency, hematuria and urgency.   Musculoskeletal:  Negative for back pain and joint swelling.   Skin:  Negative for rash and wound.   Neurological:  Negative for dizziness, syncope, weakness, light-headedness, numbness and headaches.   Hematological:  Does not bruise/bleed easily.   Psychiatric/Behavioral:  Negative for behavioral problems, hallucinations and suicidal ideas.           Objective     Last Recorded Vitals  BP (!) 150/98 (BP Location: Right arm, Patient Position: Lying)   Pulse 75   Temp 36.8 °C (98.2 °F) (Temporal)   Resp 18   Wt 92.6 kg (204 lb 2.3 oz)   SpO2 95%     Image Results  ECG 12 lead    Result Date: 8/12/2024  Sinus rhythm RSR' in V1 or V2, probably normal variant Inferior infarct, old Baseline wander in lead(s) V1    ECG 12 lead    Result Date: 8/12/2024  Sinus rhythm Borderline prolonged SD interval RSR' in V1 or V2, probably normal variant Inferior infarct, old    Transthoracic Echo (TTE) Complete    Result Date: 8/11/2024              [address] [address]      Phone [phone] Fax [phone] TRANSTHORACIC ECHOCARDIOGRAM REPORT Patient Name:      IDALIA CARXIAO Mann Physician:    [id number] Rodney Sanford DO Study Date:        8/11/2024           Ordering Provider:     [id number] IRWIN HIGGINS MRN/PID:           [id number]            Fellow: Accession#:        [id number]        Nurse:                ER Nurse Date of Birth/Age: 1970 / 54      Sonographer:          Donna benedict RDCS Gender:            M                   Additional Staff: Height:            182.88 cm           Admit Date:           8/11/2024 Weight:            94.35 kg            Admission Status:     Inpatient - STAT BSA / BMI:         2.17 m2 / 28.21     Department Location:  [facility]                    kg/m2 Blood Pressure: 138 /107 mmHg Study Type:    TRANSTHORACIC ECHO (TTE) COMPLETE Diagnosis/ICD: Saddle embolus of pulmonary artery without acute cor                pulmonale-I26.92 Indication:    PE CPT Codes:     Echo Complete w Full Doppler-[id number] Patient History: Pertinent History: NSTEMI. Study Detail: The following Echo studies were performed: 2D, M-Mode, Doppler,               color flow and Strain. Technically challenging study due to               prominent lung artifact. Optison used as a contrast agent for               endocardial border definition. Total contrast used for this               procedure was 3 mL via IV push.  PHYSICIAN INTERPRETATION: Left Ventricle: The left ventricular systolic function is normal, with a Drake's biplane calculated ejection fraction of 61%. There are no regional wall motion abnormalities. The left ventricular cavity size is normal. Left Ventricular Global Longitudinal Strain - 6.1 %. Spectral Doppler shows a normal pattern of left ventricular diastolic filling. Left Atrium: The left atrium is normal in size. Right Ventricle: The right ventricle is mildly enlarged. There is severely reduced right ventricular systolic function. RV free wall akinesis with sparing of the apex. RV free wall strain significantly reduced at -3.4%. Right Atrium: The right atrium is normal in size. Aortic Valve: The aortic  valve is trileaflet. The aortic valve dimensionless index is 0.80. There is no evidence of aortic valve regurgitation. The peak instantaneous gradient of the aortic valve is 4.4 mmHg. The mean gradient of the aortic valve is 3.0 mmHg. Mitral Valve: The mitral valve is normal in structure. There is no evidence of mitral valve regurgitation. Tricuspid Valve: The tricuspid valve is structurally normal. There is trace tricuspid regurgitation. Pulmonic Valve: The pulmonic valve is structurally normal. There is physiologic pulmonic valve regurgitation. Pericardium: There is no pericardial effusion noted. Aorta: The aortic root is normal. There is mild dilatation of the ascending aorta.  CONCLUSIONS:  1. The left ventricular systolic function is normal, with a Drake's biplane calculated ejection fraction of 61%.  2. There is severely reduced right ventricular systolic function.  3. RV free wall akinesis with sparing of the apex. RV free wall strain significantly reduced at -3.4%.  4. Mildly enlarged right ventricle. QUANTITATIVE DATA SUMMARY: 2D MEASUREMENTS:                          Normal Ranges: Ao Root d:     3.30 cm   (2.0-3.7cm) LAs:           2.80 cm   (2.7-4.0cm) IVSd:          1.00 cm   (0.6-1.1cm) LVPWd:         0.90 cm   (0.6-1.1cm) LVIDd:         4.20 cm   (3.9-5.9cm) LVIDs:         2.60 cm LV Mass Index: 59.0 g/m2 LV % FS        38.1 % LA VOLUME:                              Normal Ranges: LA Vol A4C:        17.5 ml   (22+/-6mL/m2) LA Vol A2C:        18.4 ml LA Vol BP:         20.3 ml LA Vol Index A4C:  8.1ml/m2 LA Vol Index A2C:  8.5 ml/m2 LA Vol Index BP:   9.4 ml/m2 LA Area A4C:       10.6 cm2 LA Area A2C:       9.6 cm2 LA Major Axis A4C: 5.5 cm LA Major Axis A2C: 4.3 cm LA Volume Index:   9.4 ml/m2 RA VOLUME BY A/L METHOD:                       Normal Ranges: RA Area A4C: 16.0 cm2 AORTA MEASUREMENTS:                      Normal Ranges: Ao Sinus, d: 3.30 cm (2.1-3.5cm) Ao STJ, d:   3.10 cm (1.7-3.4cm) Asc  Ao, d:   4.00 cm (2.1-3.4cm) LV SYSTOLIC FUNCTION BY 2D PLANIMETRY (MOD):                                        Normal Ranges: EF-A4C View:                      58 % (>=55%) EF-A2C View:                      65 % EF-Biplane:                       61 % LV EF Reported:                   61 % Global Longitudinal Strain (GLS): 6 % LV DIASTOLIC FUNCTION:                           Normal Ranges: MV Peak E:    0.39 m/s    (0.7-1.2 m/s) MV Peak A:    0.80 m/s    (0.42-0.7 m/s) E/A Ratio:    0.49        (1.0-2.2) MV e'         0.071 m/s   (>8.0) MV lateral e' 0.08 m/s MV medial e'  0.06 m/s MV A Dur:     103.00 msec E/e' Ratio:   5.47        (<8.0) MITRAL VALVE:                 Normal Ranges: MV DT: 117 msec (150-240msec) AORTIC VALVE:                                   Normal Ranges: AoV Vmax:                1.05 m/s (<=1.7m/s) AoV Peak P.4 mmHg (<20mmHg) AoV Mean PG:             3.0 mmHg (1.7-11.5mmHg) LVOT Max Zaid:            0.88 m/s (<=1.1m/s) AoV VTI:                 17.90 cm (18-25cm) LVOT VTI:                14.30 cm LVOT Diameter:           2.10 cm  (1.8-2.4cm) AoV Area, VTI:           2.77 cm2 (2.5-5.5cm2) AoV Area,Vmax:           2.89 cm2 (2.5-4.5cm2) AoV Dimensionless Index: 0.80  RIGHT VENTRICLE: RV Basal 3.40 cm RV Mid   2.80 cm RV Major 6.9 cm TAPSE:   13.1 mm RV s'    0.10 m/s TRICUSPID VALVE/RVSP:                             Normal Ranges: Peak TR Velocity: 2.69 m/s Est. RA Pressure: 3 mmHg RV Syst Pressure: 31.9 mmHg (< 30mmHg)  [id number] Rodney Sanford DO Electronically signed on 2024 at 1:34:58 PM  ** Final **     Vascular US lower extremity venous duplex bilateral    Result Date: 2024  Interpreted By:  Kendra Du, STUDY: [facility] US LOWER EXTREMITY VENOUS DUPLEX BILATERAL 2024 11:57 am   INDICATION: 55 y/o  M with Signs/Symptoms:Saddle PE.   COMPARISON: None.   ACCESSION NUMBER(S): [id number]   ORDERING CLINICIAN: FARHAD SANTOS   TECHNIQUE: Routine ultrasound of the  bilateral lower  extremities was performed with duplex Doppler (color and spectral) evaluation.   Static images were obtained for remote interpretation.   FINDINGS: THIGH VEINS:  The common femoral, femoral, popliteal, proximal medial saphenous, and deep femoral veins are patent and free of thrombus bilaterally.  The veins are normally compressible.  They demonstrate normal phasic flow and augmentation response bilaterally.   CALF VEINS:  The visualized peroneal and posterior tibial calf veins are patent bilaterally.       Negative study.  No sonographic evidence for deep venous thrombosis identified within the visualized portions of the deep venous system from the inguinal fossa to bilateral calf veins bilaterally.   Signed by: Kendra Du 8/11/2024 12:09 PM Dictation workstation:   [id number]    CT angio chest for pulmonary embolism    Result Date: 8/11/2024  Interpreted By:  Schoenberger, Joseph, STUDY: CT ANGIO CHEST FOR PULMONARY EMBOLISM;  8/11/2024 9:47 am   INDICATION: Signs/Symptoms:Chest tightness, shortness of breath with exertion.   COMPARISON: None.   ACCESSION NUMBER(S): [id number]   ORDERING CLINICIAN: FARHAD SANTOS   TECHNIQUE: Helical data acquisition of the chest was obtained with the intravenous injection of 75 cc Omnipaque 350.. Images were reformatted in coronal and sagittal planes. Axial and coronal MIP images were created and reviewed.   FINDINGS: POTENTIAL LIMITATIONS OF THE STUDY: None   HEART AND VESSELS: There is a large pulmonary embolus salvage over the bifurcation of the main pulmonary artery extending into the right left pulmonary arteries. Additionally emboli extend into the lobar branches partially occlusive in the right lower lobe. Peripheral flow is identified.   Main pulmonary artery and its branches are normal in caliber.   The thoracic aorta is normal in course and caliber opacify normally without significant atherosclerotic calcifications and no evidence for dissection.   No coronary artery  calcifications are seen.The study is not optimized for evaluation of coronary arteries.   The cardiac chambers are not enlarged. However, there is a reversal of the right ventricular left ventricular diameter ratio with slight bowing of the intraventricular septum suggesting right ventricular strain.   No evidence of pericardial effusion.   MEDIASTINUM AND MIKAELA, LOWER NECK AND AXILLA: The thyroid appears normal.   There is no thoracic lymphadenopathy.   The esophagus appears normal.   LUNGS AND AIRWAYS: The trachea and central airways are patent. No endobronchial lesion.   There is no pleural effusion, pneumothorax, or airspace opacity.   UPPER ABDOMEN: Dense calcification in the gallbladder neck. No hydrops, wall thickening, or pericholecystic inflammatory findings. Otherwise unremarkable.   CHEST WALL AND OSSEOUS STRUCTURES: There are no suspicious osseous lesions. Multilevel degenerative changes are present       1.  Extensive pulmonary emboli with right ventricular strain as detailed above. 2. Cholelithiasis without acute cholecystitis.     MACRO: None   Signed by: Joseph Schoenberger 8/11/2024 9:56 AM Dictation workstation:   [id number]    XR chest 1 view    Result Date: 8/11/2024  Interpreted By:  Schoenberger, Joseph, STUDY: XR CHEST 1 VIEW;  8/11/2024 9:01 am   INDICATION: Signs/Symptoms:Chest Pain.   COMPARISON: 08/17/2023   ACCESSION NUMBER(S): [id number]   ORDERING CLINICIAN: FARHAD SANTOS   FINDINGS:         CARDIOMEDIASTINAL SILHOUETTE: Cardiomediastinal silhouette is normal in size and configuration.   LUNGS: Lungs are clear.   ABDOMEN: No remarkable upper abdominal findings.   BONES: No acute osseous changes.       1.  No evidence of acute cardiopulmonary process.       MACRO: None   Signed by: Joseph Schoenberger 8/11/2024 9:06 AM Dictation workstation:   [id number]       Lab Results  Results for orders placed or performed during the hospital encounter of 08/11/24 (from the past 24 hour(s))    Transthoracic Echo (TTE) Complete   Result Value Ref Range    LVOT diam 2.10 cm    LV Biplane EF 61 %    MV E/A ratio 0.49     Tricuspid annular plane systolic excursion 1.3 cm    AV mn grad 3.0 mmHg    LA vol index A/L 9.4 ml/m2    AV pk mukesh 1.05 m/s    LV EF 61 %    RV free wall pk S' 9.79 cm/s    LV GLS 6.1 %    RVSP 31.9 mmHg    LVIDd 4.20 cm    Aortic Valve Area by Continuity of VTI 2.77 cm2    Aortic Valve Area by Continuity of Peak Velocity 2.89 cm2    AV pk grad 4.4 mmHg    LV A4C EF 58.2    Heparin Assay, UFH   Result Value Ref Range    Heparin Unfractionated 0.2 See Comment Below for Therapeutic Ranges IU/mL   Heparin Assay, UFH   Result Value Ref Range    Heparin Unfractionated 0.3 See Comment Below for Therapeutic Ranges IU/mL   Heparin Assay, UFH   Result Value Ref Range    Heparin Unfractionated 0.3 See Comment Below for Therapeutic Ranges IU/mL   CBC   Result Value Ref Range    WBC 6.3 4.4 - 11.3 x10*3/uL    nRBC 0.0 0.0 - 0.0 /100 WBCs    RBC 5.17 4.50 - 5.90 x10*6/uL    Hemoglobin 16.2 13.5 - 17.5 g/dL    Hematocrit 47.1 41.0 - 52.0 %    MCV 91 80 - 100 fL    MCH 31.3 26.0 - 34.0 pg    MCHC 34.4 32.0 - 36.0 g/dL    RDW 14.0 11.5 - 14.5 %    Platelets 199 150 - 450 x10*3/uL   Basic metabolic panel   Result Value Ref Range    Glucose 105 (H) 74 - 99 mg/dL    Sodium 138 136 - 145 mmol/L    Potassium 3.8 3.5 - 5.3 mmol/L    Chloride 104 98 - 107 mmol/L    Bicarbonate 26 21 - 32 mmol/L    Anion Gap 12 10 - 20 mmol/L    Urea Nitrogen 19 6 - 23 mg/dL    Creatinine 1.20 0.50 - 1.30 mg/dL    eGFR 71 >60 mL/min/1.73m*2    Calcium 8.9 8.6 - 10.3 mg/dL   Heparin Assay, UFH   Result Value Ref Range    Heparin Unfractionated 0.3 See Comment Below for Therapeutic Ranges IU/mL        Medications  Scheduled medications:  losartan, 50 mg, oral, Daily  pantoprazole, 40 mg, oral, Daily before breakfast   Or  pantoprazole, 40 mg, intravenous, Daily before breakfast  perflutren lipid microspheres, 0.5-10 mL of  dilution, intravenous, Once in imaging  perflutren protein A microsphere, 0.5 mL, intravenous, Once in imaging  polyethylene glycol, 17 g, oral, Daily  sulfur hexafluoride microsphr, 2 mL, intravenous, Once in imaging      Continuous medications:  heparin, 0-4,000 Units/hr, Last Rate: 1,200 Units/hr (08/12/24 0113)      PRN medications:  PRN medications: acetaminophen **OR** acetaminophen **OR** acetaminophen, heparin, morphine     Physical Exam  Constitutional:       General: He is not in acute distress.     Appearance: Normal appearance.   HENT:      Head: Normocephalic and atraumatic.      Right Ear: External ear normal.      Left Ear: External ear normal.      Nose: Nose normal.      Mouth/Throat:      Mouth: Mucous membranes are moist.      Pharynx: Oropharynx is clear.   Eyes:      Extraocular Movements: Extraocular movements intact.      Conjunctiva/sclera: Conjunctivae normal.      Pupils: Pupils are equal, round, and reactive to light.   Cardiovascular:      Rate and Rhythm: Normal rate and regular rhythm.      Pulses: Normal pulses.      Heart sounds: Normal heart sounds.   Pulmonary:      Effort: Pulmonary effort is normal. No respiratory distress.      Breath sounds: Normal breath sounds. No wheezing, rhonchi or rales.   Abdominal:      General: Bowel sounds are normal.      Palpations: Abdomen is soft.      Tenderness: There is no abdominal tenderness. There is no right CVA tenderness, left CVA tenderness, guarding or rebound.   Musculoskeletal:         General: No swelling. Normal range of motion.      Cervical back: Normal range of motion and neck supple.   Skin:     General: Skin is warm and dry.      Capillary Refill: Capillary refill takes less than 2 seconds.      Findings: No lesion or rash.   Neurological:      General: No focal deficit present.      Mental Status: He is alert and oriented to person, place, and time. Mental status is at baseline.   Psychiatric:         Mood and Affect: Mood  normal.         Behavior: Behavior normal.                  Code Status  Full Code     Assessment/Plan   This patient currently has cardiac telemetry ordered; if you would like to modify or discontinue the telemetry order, click here to go to the orders activity to modify/discontinue the order.  Acute saddle PE with right heart strain  Hep gtt  Montior on tele  Appreciate cardiology recommendations, have referred to PERT team  Hemodynamically stable currently, on RA  Can likely transition to eliquis tomorrow if no intervention deemed necessary  Referral to pulmonology and hematology outpatient  Could possibly be from recent travel to [place] but this was only a 2 hour flight  For now will classify as unprovoked, could consider hypercoagulable workup  No recent hospitalizations, long travel, surgeries. Up to date on cancer screenings. No family history of bleeding or clotting disorders to his knowledge but did just have an uncle admitted for 6 days which he is trying to figure out what that was for     Elevated troponin 2/2 acute PE with heart strain  As above    HTN  Patient recently changed from amlodipine to losartan  Monitor BP and adjust as necessary    DLD  Nonobstructive CAD    DVT ppx: Heparin drip       Please see orders for more complete plan    Rosa Nuñez PA-C

## 2024-08-12 NOTE — PROGRESS NOTES
08/12/24 1231   Discharge Planning   Living Arrangements Spouse/significant other   Support Systems Spouse/significant other   Assistance Needed Independent   Type of Residence Private residence   Home or Post Acute Services None   Expected Discharge Disposition Home   Does the patient need discharge transport arranged? No   Financial Resource Strain   How hard is it for you to pay for the very basics like food, housing, medical care, and heating? Not hard   Housing Stability   In the last 12 months, was there a time when you were not able to pay the mortgage or rent on time? N   At any time in the past 12 months, were you homeless or living in a shelter (including now)? N   Transportation Needs   In the past 12 months, has lack of transportation kept you from medical appointments or from getting medications? no   In the past 12 months, has lack of transportation kept you from meetings, work, or from getting things needed for daily living? No     PCP is Chuy Soto MD. Patient is from home with his wife. Patient is independent with ambulation, self care, driving, shopping, and meals.  Patient prefers to return home with no needs upon discharge. TCC will continue to follow for needs if they arise.

## 2024-08-12 NOTE — PROGRESS NOTES
Social work consult placed for positive medical risk screen for etoh. SW reviewed pt's chart and communicated with TCC. SW met with pt and pt's wife at bedside to assess needs and provide support, introduced self and my role as  with care transition team. Explored pt's thoughts toward drinking behaviors, awareness, and willingness for tx. Pt stated he is a social drinker and does not drink often. Pt explained he does not have cravings or withdrawals and even gave up drinking for 40 days in past with no concerns. Pt and family appreciative of SW support. No other needs identified at this time, SW signing off,  pt and care team aware of SW availability while inpt.    Leatha Car, MSW, LSW ([id number])   Care Transitions

## 2024-08-12 NOTE — TREATMENT PLAN
Pulmonary Embolism Response Team         REASON FOR PERT: Acute Pulmonary Embolism     REFERRING PRACTITIONER: LEONARD Hampton      History of Presenting Illness:  Julio Cesar Casey is a 54 year old male with past medical history of hypertension and obesity. He presented the ER on 8/11/24 with midsternal heaviness and shortness of breath since 8/10/24. See full H&P in cardiology consult/progress note.      PULMONARY EMBOLISM RISK STRATIFICATION  ·         Concerning PE symptoms or radiographic findings (syncope, hypotension, large clot burden): YES  ·         Massive PE (SBP<90mmHg for >15 min, requiring pressors, cardiac arrest): NO   ·         High risk PE (RV dilation on CTA and/or TTE, elevated Troponin/BNP, residual DVT): YES  ·         PE with contraindication for anticoagulation: NO     Bleeding risk factors include: Simplified PESI:    ·         Age>75 NO  ·         Previous bleeding NO  ·         Cancer NO  ·         Renal failure NO  ·         Liver failure NO  ·         Thrombocytopenia NO  ·         Anemia NO  ·         Previous CVA NO  ·         Diabetes NO  ·         Antiplatelet/Anticoagulant NO  ·         Surgery <3 weeks NO  ·         Frequent falls NO  ·         Alcohol abuse NO  ·         NSAID use NO    ·         Age >80 NO  ·         History of Cancer NO  ·         History of Chronic Cardiopulmonary disease NO  ·         HR >110 NO  ·         SBP<100 mmHg NO  ·         SpO2 < 90% NO     TOTAL: 0 [0 = low risk, 1 = moderate, 2 or more = high risk] TOTAL: 0 [0 = low risk, 1 or greater = high risk]     Majority of Bleeding risk is due to no bleeding risk identified.      Risk factors for PROVOKED PE include:  ·         Surgery within 3 months None  ·         Trauma within 3 months None  ·         Fracture or application of a plaster cast to lower limb within 2 months None Confined to bed for at least 3 days with an acute illness within 3 months None  ·         > 6 hour continuous air flight or road  travel within a week of symptom onset None  ·         Active cancer None  ·         Pregnancy,  Section or childbirth within 6 weeks None  ·         Estrogen therapy within 2 months None     Other PE Risk Factors include:  Collagen Vascular Disease None  Inflammatory Bowel Disease None  Smoking None  Prior VTE None  Family history of VTE None  Hormone use None  Recent trauma None  Indwelling Catheter None     Comorbidities:  COPD None  Connective Tissue Disease None  CHF None  CAD None  Depression/anxiety None  DM None  Malignancy None If yes, site of malignancy: NA  Inflammatory Bowel Disease None  HTN None  GIB None  Renal Insufficiency None  Stroke/neurovascular disease None     Labs:  BNP was 77.   Troponin was 251-244     Imaging:  CT-angiography: Interpreted By:  Schoenberger, Joseph,   STUDY:  CT ANGIO CHEST FOR PULMONARY EMBOLISM;  2024 9:47 am      INDICATION:  Signs/Symptoms:Chest tightness, shortness of breath with exertion.      COMPARISON:  None.      ACCESSION NUMBER(S):  [id number]      ORDERING CLINICIAN:  FARHAD SANTOS      TECHNIQUE:  Helical data acquisition of the chest was obtained with the  intravenous injection of 75 cc Omnipaque 350.. Images were  reformatted in coronal and sagittal planes. Axial and coronal MIP  images were created and reviewed.      FINDINGS:  POTENTIAL LIMITATIONS OF THE STUDY: None      HEART AND VESSELS:  There is a large pulmonary embolus salvage over the bifurcation of  the main pulmonary artery extending into the right left pulmonary  arteries. Additionally emboli extend into the lobar branches  partially occlusive in the right lower lobe. Peripheral flow is  identified.      Main pulmonary artery and its branches are normal in caliber.      The thoracic aorta is normal in course and caliber opacify normally  without significant atherosclerotic calcifications and no evidence  for dissection.      No coronary artery calcifications are seen.The study is not  optimized  for evaluation of coronary arteries.      The cardiac chambers are not enlarged. However, there is a reversal  of the right ventricular left ventricular diameter ratio with slight  bowing of the intraventricular septum suggesting right ventricular  strain.      No evidence of pericardial effusion.      MEDIASTINUM AND MIKAELA, LOWER NECK AND AXILLA:  The thyroid appears normal.      There is no thoracic lymphadenopathy.      The esophagus appears normal.      LUNGS AND AIRWAYS:  The trachea and central airways are patent. No endobronchial lesion.      There is no pleural effusion, pneumothorax, or airspace opacity.      UPPER ABDOMEN:  Dense calcification in the gallbladder neck. No hydrops, wall  thickening, or pericholecystic inflammatory findings. Otherwise  unremarkable.      CHEST WALL AND OSSEOUS STRUCTURES:  There are no suspicious osseous lesions. Multilevel degenerative  changes are present      IMPRESSION:  1.  Extensive pulmonary emboli with right ventricular strain as  detailed above.  2. Cholelithiasis without acute cholecystitis.          MACRO:  None      Signed by: Joseph Schoenberger 8/11/2024 9:56 AM  Dictation workstation:   [id number]      US Lower extremities: Interpreted By:  Kendra Du,   STUDY:  [facility] US LOWER EXTREMITY VENOUS DUPLEX BILATERAL 8/11/2024 11:57 am      INDICATION:  53 y/o  M with Signs/Symptoms:Saddle PE.      COMPARISON:  None.      ACCESSION NUMBER(S):  [id number]      ORDERING CLINICIAN:  FARHAD SANTOS      TECHNIQUE:  Routine ultrasound of the  bilateral lower extremities was performed  with duplex Doppler (color and spectral) evaluation.   Static images  were obtained for remote interpretation.      FINDINGS:  THIGH VEINS:  The common femoral, femoral, popliteal, proximal medial  saphenous, and deep femoral veins are patent and free of thrombus  bilaterally.  The veins are normally compressible.  They demonstrate  normal phasic flow and augmentation response  bilaterally.      CALF VEINS:  The visualized peroneal and posterior tibial calf veins  are patent bilaterally.      IMPRESSION:  Negative study.  No sonographic evidence for deep venous thrombosis  identified within the visualized portions of the deep venous system  from the inguinal fossa to bilateral calf veins bilaterally.      Signed by: Kendra Du 8/11/2024 12:09 PM  Dictation workstation:   [id number]     Cardiodiagnostics:  TTE ((include RVSP, TAPSE, RVEF, and RV:LV ratio if available):   [place] [id number]       Phone [phone] Fax [phone]     TRANSTHORACIC ECHOCARDIOGRAM REPORT     Patient Name:      IDALIA Mann Physician:    [id number] Irwin Higgins DO  Study Date:        8/11/2024           Ordering Provider:    [id number] IRWIN HIGGINS  MRN/PID:           [id number]            Fellow:  Accession#:        [id number]        Nurse:                ER Nurse  Date of Birth/Age: 1970 / 54      Sonographer:          Donna benedict RDCS  Gender:            M                   Additional Staff:  Height:            182.88 cm           Admit Date:           8/11/2024  Weight:            94.35 kg            Admission Status:     Inpatient - STAT  BSA / BMI:         2.17 m2 / 28.21     Department Location:  [facility]                     kg/m2  Blood Pressure: 138 /107 mmHg     Study Type:    TRANSTHORACIC ECHO (TTE) COMPLETE  Diagnosis/ICD: Saddle embolus of pulmonary artery without acute cor                 pulmonale-I26.92  Indication:    PE  CPT Codes:     Echo Complete w Full Doppler-40313     Patient History:  Pertinent History: NSTEMI.     Study Detail: The following Echo studies were performed: 2D, M-Mode, Doppler,                color flow and Strain. Technically challenging study due to                prominent lung artifact. Optison used as a contrast agent for                 endocardial border definition. Total contrast used for this                procedure was 3 mL via IV push.        PHYSICIAN INTERPRETATION:  Left Ventricle: The left ventricular systolic function is normal, with a Drake's biplane calculated ejection fraction of 61%. There are no regional wall motion abnormalities. The left ventricular cavity size is normal. Left Ventricular Global Longitudinal Strain - 6.1 %. Spectral Doppler shows a normal pattern of left ventricular diastolic filling.  Left Atrium: The left atrium is normal in size.  Right Ventricle: The right ventricle is mildly enlarged. There is severely reduced right ventricular systolic function. RV free wall akinesis with sparing of the apex. RV free wall strain significantly reduced at -3.4%.  Right Atrium: The right atrium is normal in size.  Aortic Valve: The aortic valve is trileaflet. The aortic valve dimensionless index is 0.80. There is no evidence of aortic valve regurgitation. The peak instantaneous gradient of the aortic valve is 4.4 mmHg. The mean gradient of the aortic valve is 3.0 mmHg.  Mitral Valve: The mitral valve is normal in structure. There is no evidence of mitral valve regurgitation.  Tricuspid Valve: The tricuspid valve is structurally normal. There is trace tricuspid regurgitation.  Pulmonic Valve: The pulmonic valve is structurally normal. There is physiologic pulmonic valve regurgitation.  Pericardium: There is no pericardial effusion noted.  Aorta: The aortic root is normal. There is mild dilatation of the ascending aorta.        CONCLUSIONS:   1. The left ventricular systolic function is normal, with a Drake's biplane calculated ejection fraction of 61%.   2. There is severely reduced right ventricular systolic function.   3. RV free wall akinesis with sparing of the apex. RV free wall strain significantly reduced at -3.4%.   4. Mildly enlarged right ventricle.     QUANTITATIVE DATA SUMMARY:  2D MEASUREMENTS:                            Normal Ranges:  Ao Root d:     3.30 cm   (2.0-3.7cm)  LAs:           2.80 cm   (2.7-4.0cm)  IVSd:          1.00 cm   (0.6-1.1cm)  LVPWd:         0.90 cm   (0.6-1.1cm)  LVIDd:         4.20 cm   (3.9-5.9cm)  LVIDs:         2.60 cm  LV Mass Index: 59.0 g/m2  LV % FS        38.1 %     LA VOLUME:                               Normal Ranges:  LA Vol A4C:        17.5 ml   (22+/-6mL/m2)  LA Vol A2C:        18.4 ml  LA Vol BP:         20.3 ml  LA Vol Index A4C:  8.1ml/m2  LA Vol Index A2C:  8.5 ml/m2  LA Vol Index BP:   9.4 ml/m2  LA Area A4C:       10.6 cm2  LA Area A2C:       9.6 cm2  LA Major Axis A4C: 5.5 cm  LA Major Axis A2C: 4.3 cm  LA Volume Index:   9.4 ml/m2     RA VOLUME BY A/L METHOD:                        Normal Ranges:  RA Area A4C: 16.0 cm2     AORTA MEASUREMENTS:                       Normal Ranges:  Ao Sinus, d: 3.30 cm (2.1-3.5cm)  Ao STJ, d:   3.10 cm (1.7-3.4cm)  Asc Ao, d:   4.00 cm (2.1-3.4cm)     LV SYSTOLIC FUNCTION BY 2D PLANIMETRY (MOD):                                         Normal Ranges:  EF-A4C View:                      58 % (>=55%)  EF-A2C View:                      65 %  EF-Biplane:                       61 %  LV EF Reported:                   61 %  Global Longitudinal Strain (GLS): 6 %     LV DIASTOLIC FUNCTION:                            Normal Ranges:  MV Peak E:    0.39 m/s    (0.7-1.2 m/s)  MV Peak A:    0.80 m/s    (0.42-0.7 m/s)  E/A Ratio:    0.49        (1.0-2.2)  MV e'         0.071 m/s   (>8.0)  MV lateral e' 0.08 m/s  MV medial e'  0.06 m/s  MV A Dur:     103.00 msec  E/e' Ratio:   5.47        (<8.0)     MITRAL VALVE:                  Normal Ranges:  MV DT: 117 msec (150-240msec)     AORTIC VALVE:                                    Normal Ranges:  AoV Vmax:                1.05 m/s (<=1.7m/s)  AoV Peak P.4 mmHg (<20mmHg)  AoV Mean PG:             3.0 mmHg (1.7-11.5mmHg)  LVOT Max Zaid:            0.88 m/s (<=1.1m/s)  AoV VTI:                 17.90 cm  (18-25cm)  LVOT VTI:                14.30 cm  LVOT Diameter:           2.10 cm  (1.8-2.4cm)  AoV Area, VTI:           2.77 cm2 (2.5-5.5cm2)  AoV Area,Vmax:           2.89 cm2 (2.5-4.5cm2)  AoV Dimensionless Index: 0.80        RIGHT VENTRICLE:  RV Basal 3.40 cm  RV Mid   2.80 cm  RV Major 6.9 cm  TAPSE:   13.1 mm  RV s'    0.10 m/s     TRICUSPID VALVE/RVSP:                              Normal Ranges:  Peak TR Velocity: 2.69 m/s  Est. RA Pressure: 3 mmHg  RV Syst Pressure: 31.9 mmHg (< 30mmHg)        [id number] Rodney Sanford DO  Electronically signed on 8/11/2024 at 1:34:58 PM           ** Final **       EKG: Sinus rhythm  RSR' in V1 or V2, probably normal variant  Inferior infarct, old  Baseline wander in lead(s) V1        Assessment:     Julio Cesar Casey is a 54 year old male with shortness of breath presenting with a bilateral saddle pulmonary embolism with evidence of RV dysfunction and without evidence of DVT. Given the available data and clinical history, this is consistent with an unprovoked submassive intermediate risk PE with low bleeding risk.    Patient denies any shortness of breath at rest. No chest heaviness. Sitting upright in the chair. Walking in the room without issues.      Recommendations:     Discussed case with Interventional Cardiologist, Dr. Gerardo, we recommend the following:     - CLOT BURDEN REDUCTION STRATEGY:   Continue anticoagulate therapy with heparin drip overnight     -Check BNP in AM. Will ambulate in unit tomorrow morning to assess symptoms and oxygent saturation. If patient oxygen saturation drops less than 88% or if patient experiences shortness of breath/chest heaviness will consider mechanical thrombectomy procedure.      - SUPPLEMENTAL O2 for goal SpO2 > 92%     - AMBULATION: Early mobilization when feasible is recommended. The presence of DVT/PE does not contraindicate ambulation.       PERT Response Team Pager: [facility] [place] if any acute events overnight [phone]-if patient has  any acute events

## 2024-08-13 LAB
ANION GAP SERPL CALC-SCNC: 11 MMOL/L (ref 10–20)
BNP SERPL-MCNC: 17 PG/ML (ref 0–99)
BUN SERPL-MCNC: 18 MG/DL (ref 6–23)
CALCIUM SERPL-MCNC: 8.8 MG/DL (ref 8.6–10.3)
CHLORIDE SERPL-SCNC: 102 MMOL/L (ref 98–107)
CO2 SERPL-SCNC: 29 MMOL/L (ref 21–32)
CREAT SERPL-MCNC: 1.18 MG/DL (ref 0.5–1.3)
EGFRCR SERPLBLD CKD-EPI 2021: 73 ML/MIN/1.73M*2
ERYTHROCYTE [DISTWIDTH] IN BLOOD BY AUTOMATED COUNT: 13.8 % (ref 11.5–14.5)
GLUCOSE SERPL-MCNC: 93 MG/DL (ref 74–99)
HCT VFR BLD AUTO: 46.6 % (ref 41–52)
HGB BLD-MCNC: 15.8 G/DL (ref 13.5–17.5)
MAGNESIUM SERPL-MCNC: 2.11 MG/DL (ref 1.6–2.4)
MCH RBC QN AUTO: 31 PG (ref 26–34)
MCHC RBC AUTO-ENTMCNC: 33.9 G/DL (ref 32–36)
MCV RBC AUTO: 92 FL (ref 80–100)
NRBC BLD-RTO: 0 /100 WBCS (ref 0–0)
PLATELET # BLD AUTO: 204 X10*3/UL (ref 150–450)
POTASSIUM SERPL-SCNC: 4.1 MMOL/L (ref 3.5–5.3)
RBC # BLD AUTO: 5.09 X10*6/UL (ref 4.5–5.9)
SODIUM SERPL-SCNC: 138 MMOL/L (ref 136–145)
UFH PPP CHRO-ACNC: 0.3 IU/ML
WBC # BLD AUTO: 5.7 X10*3/UL (ref 4.4–11.3)

## 2024-08-13 PROCEDURE — 83880 ASSAY OF NATRIURETIC PEPTIDE: CPT | Performed by: NURSE PRACTITIONER

## 2024-08-13 PROCEDURE — 2500000001 HC RX 250 WO HCPCS SELF ADMINISTERED DRUGS (ALT 637 FOR MEDICARE OP): Performed by: NURSE PRACTITIONER

## 2024-08-13 PROCEDURE — 85027 COMPLETE CBC AUTOMATED: CPT | Performed by: PHYSICIAN ASSISTANT

## 2024-08-13 PROCEDURE — 36415 COLL VENOUS BLD VENIPUNCTURE: CPT | Performed by: NURSE PRACTITIONER

## 2024-08-13 PROCEDURE — 2500000001 HC RX 250 WO HCPCS SELF ADMINISTERED DRUGS (ALT 637 FOR MEDICARE OP): Performed by: PHYSICIAN ASSISTANT

## 2024-08-13 PROCEDURE — 85520 HEPARIN ASSAY: CPT | Performed by: PHYSICIAN ASSISTANT

## 2024-08-13 PROCEDURE — 99232 SBSQ HOSP IP/OBS MODERATE 35: CPT | Performed by: NURSE PRACTITIONER

## 2024-08-13 PROCEDURE — 83735 ASSAY OF MAGNESIUM: CPT | Performed by: PHYSICIAN ASSISTANT

## 2024-08-13 PROCEDURE — 80048 BASIC METABOLIC PNL TOTAL CA: CPT | Performed by: PHYSICIAN ASSISTANT

## 2024-08-13 PROCEDURE — 2060000001 HC INTERMEDIATE ICU ROOM DAILY

## 2024-08-13 RX ORDER — HEPARIN SODIUM 10000 [USP'U]/100ML
0-4000 INJECTION, SOLUTION INTRAVENOUS CONTINUOUS
Status: ACTIVE | OUTPATIENT
Start: 2024-08-13 | End: 2024-08-13

## 2024-08-13 RX ADMIN — APIXABAN 10 MG: 5 TABLET, FILM COATED ORAL at 21:02

## 2024-08-13 RX ADMIN — LOSARTAN POTASSIUM 50 MG: 50 TABLET, FILM COATED ORAL at 09:50

## 2024-08-13 ASSESSMENT — ENCOUNTER SYMPTOMS
FACIAL SWELLING: 0
CHILLS: 0
PALPITATIONS: 0
FREQUENCY: 0
JOINT SWELLING: 0
LIGHT-HEADEDNESS: 0
HEADACHES: 0
TROUBLE SWALLOWING: 0
SORE THROAT: 0
FEVER: 0
BRUISES/BLEEDS EASILY: 0
BACK PAIN: 0
VOMITING: 0
EYE PAIN: 0
HALLUCINATIONS: 0
SHORTNESS OF BREATH: 0
FATIGUE: 0
NUMBNESS: 0
ABDOMINAL PAIN: 0
COUGH: 0
DIARRHEA: 0
FLANK PAIN: 0
WOUND: 0
CONSTIPATION: 0
WHEEZING: 0
HEMATURIA: 0
WEAKNESS: 0
DYSURIA: 0
NAUSEA: 0
DIAPHORESIS: 0
DIZZINESS: 0
CHEST TIGHTNESS: 0
APPETITE CHANGE: 0
BLOOD IN STOOL: 0

## 2024-08-13 ASSESSMENT — COGNITIVE AND FUNCTIONAL STATUS - GENERAL
MOBILITY SCORE: 24
DAILY ACTIVITIY SCORE: 24

## 2024-08-13 ASSESSMENT — PAIN - FUNCTIONAL ASSESSMENT
PAIN_FUNCTIONAL_ASSESSMENT: 0-10
PAIN_FUNCTIONAL_ASSESSMENT: 0-10

## 2024-08-13 ASSESSMENT — PAIN SCALES - GENERAL
PAINLEVEL_OUTOF10: 0 - NO PAIN
PAINLEVEL_OUTOF10: 0 - NO PAIN

## 2024-08-13 NOTE — PROGRESS NOTES
Julio Cesar Casey is a 54 y.o. male on day 2 of admission presenting with NSTEMI (non-ST elevated myocardial infarction) (Multi).      Subjective   Julio Cesar Casey is a 54 y.o. male with PMHx s/f HTN, nonobstructive CAD presented 8/12/24 with Chest pain. The patient presented to emergency department with complaint of heaviness in the chest shortness of breath some chest discomfort starting yesterday.  Patient states that symptoms were worse when he gets up and moves he even just going to the bathroom and back patient had developed some shortness of breath and chest heaviness.  At rest the patient is not having any chest heaviness.  Patient reports a prior history of cardiac cath they told him he had only like 10% blockage.  Patient has not had any recent surgery he did have a trip to [place] and back in June.  He denies any fevers chills rigors any purulent sputum any hemoptysis any leg pain. Troponin initially 250 1 repeat troponin 244 BN peptide 77 lactate 1.0. EKG showing sinus rhythm no intraventricular conduction delay no ST elevation. CTA of the chest showed saddle PE with evidence for RV strain.  The ED provider contacted the PERT team.  Given that patient was normotensive heart rate was around 100 was advised to check additional lab studies which have been relatively unremarkable.  Patient was felt not to need emergent intervention from the PERT team. Echo: EF 61%, severely reduced right ventricular systolic function, RV free wall akinesis with sparing of the apex, RV free wall strain significantly reduced at -3.4%, mildly enlarged right ventricle. US BLE without DVT    8/12/2024: No acute events overnight. Vitals stable, BP stable 144/87 this morning, remains 97% on RA, HR 64. Labs are unremarkable.  BNP 17  Patient reviewed by PERT team again, vitals remained stable, no tachycardia, hypotension or hypoxia.  He was ambulated personally with their nurse practitioner with out any hypoxia or significant tachycardia during  ambulation, patient did not even have any shortness of breath or chest pain at this time and he reports he took 3 laps around the unit.  Patient to have discussion with pharmacy this afternoon regarding decision between Eliquis and Xarelto.  Will likely start this evening, if tolerating can discharge to home tomorrow.         Review of Systems   Constitutional:  Negative for appetite change, chills, diaphoresis, fatigue and fever.   HENT:  Negative for congestion, ear pain, facial swelling, hearing loss, nosebleeds, sore throat, tinnitus and trouble swallowing.    Eyes:  Negative for pain.   Respiratory:  Negative for cough, chest tightness, shortness of breath and wheezing.    Cardiovascular:  Negative for chest pain, palpitations and leg swelling.   Gastrointestinal:  Negative for abdominal pain, blood in stool, constipation, diarrhea, nausea and vomiting.   Genitourinary:  Negative for dysuria, flank pain, frequency, hematuria and urgency.   Musculoskeletal:  Negative for back pain and joint swelling.   Skin:  Negative for rash and wound.   Neurological:  Negative for dizziness, syncope, weakness, light-headedness, numbness and headaches.   Hematological:  Does not bruise/bleed easily.   Psychiatric/Behavioral:  Negative for behavioral problems, hallucinations and suicidal ideas.           Objective     Last Recorded Vitals  /87 (BP Location: Right arm, Patient Position: Lying)   Pulse 64   Temp 36.6 °C (97.9 °F) (Temporal)   Resp 26   Wt 92.6 kg (204 lb 2.3 oz)   SpO2 97%     Image Results  ECG 12 lead    Result Date: 8/12/2024  Sinus rhythm RSR' in V1 or V2, probably normal variant Inferior infarct, old Baseline wander in lead(s) V1    ECG 12 lead    Result Date: 8/12/2024  Sinus rhythm Borderline prolonged GA interval RSR' in V1 or V2, probably normal variant Inferior infarct, old    Transthoracic Echo (TTE) Complete    Result Date: 8/11/2024              [address] [place] [address]      Phone [phone] Fax [phone] TRANSTHORACIC ECHOCARDIOGRAM REPORT Patient Name:      IDALIA Mann Physician:    [id number] Irwin Higgins DO Study Date:        8/11/2024           Ordering Provider:    [id number] IRWIN HIGGINS MRN/PID:           [id number]            Fellow: Accession#:        [id number]        Nurse:                ER Nurse Date of Birth/Age: 1970 / 54      Sonographer:          Donna benedict                                     RDERIKA Gender:            M                   Additional Staff: Height:            182.88 cm           Admit Date:           8/11/2024 Weight:            94.35 kg            Admission Status:     Inpatient - STAT BSA / BMI:         2.17 m2 / 28.21     Department Location:  [facility]                    kg/m2 Blood Pressure: 138 /107 mmHg Study Type:    TRANSTHORACIC ECHO (TTE) COMPLETE Diagnosis/ICD: Saddle embolus of pulmonary artery without acute cor                pulmonale-I26.92 Indication:    PE CPT Codes:     Echo Complete w Full Doppler-[id number] Patient History: Pertinent History: NSTEMI. Study Detail: The following Echo studies were performed: 2D, M-Mode, Doppler,               color flow and Strain. Technically challenging study due to               prominent lung artifact. Optison used as a contrast agent for               endocardial border definition. Total contrast used for this               procedure was 3 mL via IV push.  PHYSICIAN INTERPRETATION: Left Ventricle: The left ventricular systolic function is normal, with a Drake's biplane calculated ejection fraction of 61%. There are no regional wall motion abnormalities. The left ventricular cavity size is normal. Left Ventricular Global Longitudinal Strain - 6.1 %. Spectral Doppler shows a normal pattern of left ventricular diastolic filling. Left Atrium: The left atrium is normal in size. Right Ventricle: The right ventricle is mildly  enlarged. There is severely reduced right ventricular systolic function. RV free wall akinesis with sparing of the apex. RV free wall strain significantly reduced at -3.4%. Right Atrium: The right atrium is normal in size. Aortic Valve: The aortic valve is trileaflet. The aortic valve dimensionless index is 0.80. There is no evidence of aortic valve regurgitation. The peak instantaneous gradient of the aortic valve is 4.4 mmHg. The mean gradient of the aortic valve is 3.0 mmHg. Mitral Valve: The mitral valve is normal in structure. There is no evidence of mitral valve regurgitation. Tricuspid Valve: The tricuspid valve is structurally normal. There is trace tricuspid regurgitation. Pulmonic Valve: The pulmonic valve is structurally normal. There is physiologic pulmonic valve regurgitation. Pericardium: There is no pericardial effusion noted. Aorta: The aortic root is normal. There is mild dilatation of the ascending aorta.  CONCLUSIONS:  1. The left ventricular systolic function is normal, with a Drake's biplane calculated ejection fraction of 61%.  2. There is severely reduced right ventricular systolic function.  3. RV free wall akinesis with sparing of the apex. RV free wall strain significantly reduced at -3.4%.  4. Mildly enlarged right ventricle. QUANTITATIVE DATA SUMMARY: 2D MEASUREMENTS:                          Normal Ranges: Ao Root d:     3.30 cm   (2.0-3.7cm) LAs:           2.80 cm   (2.7-4.0cm) IVSd:          1.00 cm   (0.6-1.1cm) LVPWd:         0.90 cm   (0.6-1.1cm) LVIDd:         4.20 cm   (3.9-5.9cm) LVIDs:         2.60 cm LV Mass Index: 59.0 g/m2 LV % FS        38.1 % LA VOLUME:                              Normal Ranges: LA Vol A4C:        17.5 ml   (22+/-6mL/m2) LA Vol A2C:        18.4 ml LA Vol BP:         20.3 ml LA Vol Index A4C:  8.1ml/m2 LA Vol Index A2C:  8.5 ml/m2 LA Vol Index BP:   9.4 ml/m2 LA Area A4C:       10.6 cm2 LA Area A2C:       9.6 cm2 LA Major Axis A4C: 5.5 cm LA Major Axis  A2C: 4.3 cm LA Volume Index:   9.4 ml/m2 RA VOLUME BY A/L METHOD:                       Normal Ranges: RA Area A4C: 16.0 cm2 AORTA MEASUREMENTS:                      Normal Ranges: Ao Sinus, d: 3.30 cm (2.1-3.5cm) Ao STJ, d:   3.10 cm (1.7-3.4cm) Asc Ao, d:   4.00 cm (2.1-3.4cm) LV SYSTOLIC FUNCTION BY 2D PLANIMETRY (MOD):                                        Normal Ranges: EF-A4C View:                      58 % (>=55%) EF-A2C View:                      65 % EF-Biplane:                       61 % LV EF Reported:                   61 % Global Longitudinal Strain (GLS): 6 % LV DIASTOLIC FUNCTION:                           Normal Ranges: MV Peak E:    0.39 m/s    (0.7-1.2 m/s) MV Peak A:    0.80 m/s    (0.42-0.7 m/s) E/A Ratio:    0.49        (1.0-2.2) MV e'         0.071 m/s   (>8.0) MV lateral e' 0.08 m/s MV medial e'  0.06 m/s MV A Dur:     103.00 msec E/e' Ratio:   5.47        (<8.0) MITRAL VALVE:                 Normal Ranges: MV DT: 117 msec (150-240msec) AORTIC VALVE:                                   Normal Ranges: AoV Vmax:                1.05 m/s (<=1.7m/s) AoV Peak P.4 mmHg (<20mmHg) AoV Mean PG:             3.0 mmHg (1.7-11.5mmHg) LVOT Max Zaid:            0.88 m/s (<=1.1m/s) AoV VTI:                 17.90 cm (18-25cm) LVOT VTI:                14.30 cm LVOT Diameter:           2.10 cm  (1.8-2.4cm) AoV Area, VTI:           2.77 cm2 (2.5-5.5cm2) AoV Area,Vmax:           2.89 cm2 (2.5-4.5cm2) AoV Dimensionless Index: 0.80  RIGHT VENTRICLE: RV Basal 3.40 cm RV Mid   2.80 cm RV Major 6.9 cm TAPSE:   13.1 mm RV s'    0.10 m/s TRICUSPID VALVE/RVSP:                             Normal Ranges: Peak TR Velocity: 2.69 m/s Est. RA Pressure: 3 mmHg RV Syst Pressure: 31.9 mmHg (< 30mmHg)  [id number] Rodney Claribel SEQUEIRA Electronically signed on 2024 at 1:34:58 PM  ** Final **     Vascular US lower extremity venous duplex bilateral    Result Date: 2024  Interpreted By:  Kendra Du, STUDY: [facility] US LOWER  EXTREMITY VENOUS DUPLEX BILATERAL 8/11/2024 11:57 am   INDICATION: 53 y/o  M with Signs/Symptoms:Saddle PE.   COMPARISON: None.   ACCESSION NUMBER(S): [id number]   ORDERING CLINICIAN: FARHAD SANTOS   TECHNIQUE: Routine ultrasound of the  bilateral lower extremities was performed with duplex Doppler (color and spectral) evaluation.   Static images were obtained for remote interpretation.   FINDINGS: THIGH VEINS:  The common femoral, femoral, popliteal, proximal medial saphenous, and deep femoral veins are patent and free of thrombus bilaterally.  The veins are normally compressible.  They demonstrate normal phasic flow and augmentation response bilaterally.   CALF VEINS:  The visualized peroneal and posterior tibial calf veins are patent bilaterally.       Negative study.  No sonographic evidence for deep venous thrombosis identified within the visualized portions of the deep venous system from the inguinal fossa to bilateral calf veins bilaterally.   Signed by: Kendra Du 8/11/2024 12:09 PM Dictation workstation:   [id number]    CT angio chest for pulmonary embolism    Result Date: 8/11/2024  Interpreted By:  Schoenberger, Joseph, STUDY: CT ANGIO CHEST FOR PULMONARY EMBOLISM;  8/11/2024 9:47 am   INDICATION: Signs/Symptoms:Chest tightness, shortness of breath with exertion.   COMPARISON: None.   ACCESSION NUMBER(S): [id number]   ORDERING CLINICIAN: FARHAD SANTOS   TECHNIQUE: Helical data acquisition of the chest was obtained with the intravenous injection of 75 cc Omnipaque 350.. Images were reformatted in coronal and sagittal planes. Axial and coronal MIP images were created and reviewed.   FINDINGS: POTENTIAL LIMITATIONS OF THE STUDY: None   HEART AND VESSELS: There is a large pulmonary embolus salvage over the bifurcation of the main pulmonary artery extending into the right left pulmonary arteries. Additionally emboli extend into the lobar branches partially occlusive in the right lower lobe. Peripheral flow  is identified.   Main pulmonary artery and its branches are normal in caliber.   The thoracic aorta is normal in course and caliber opacify normally without significant atherosclerotic calcifications and no evidence for dissection.   No coronary artery calcifications are seen.The study is not optimized for evaluation of coronary arteries.   The cardiac chambers are not enlarged. However, there is a reversal of the right ventricular left ventricular diameter ratio with slight bowing of the intraventricular septum suggesting right ventricular strain.   No evidence of pericardial effusion.   MEDIASTINUM AND MIKAELA, LOWER NECK AND AXILLA: The thyroid appears normal.   There is no thoracic lymphadenopathy.   The esophagus appears normal.   LUNGS AND AIRWAYS: The trachea and central airways are patent. No endobronchial lesion.   There is no pleural effusion, pneumothorax, or airspace opacity.   UPPER ABDOMEN: Dense calcification in the gallbladder neck. No hydrops, wall thickening, or pericholecystic inflammatory findings. Otherwise unremarkable.   CHEST WALL AND OSSEOUS STRUCTURES: There are no suspicious osseous lesions. Multilevel degenerative changes are present       1.  Extensive pulmonary emboli with right ventricular strain as detailed above. 2. Cholelithiasis without acute cholecystitis.     MACRO: None   Signed by: Joseph Schoenberger 8/11/2024 9:56 AM Dictation workstation:   [id number]    XR chest 1 view    Result Date: 8/11/2024  Interpreted By:  Schoenberger, Joseph, STUDY: XR CHEST 1 VIEW;  8/11/2024 9:01 am   INDICATION: Signs/Symptoms:Chest Pain.   COMPARISON: 08/17/2023   ACCESSION NUMBER(S): [id number]   ORDERING CLINICIAN: FARHAD SANTOS   FINDINGS:         CARDIOMEDIASTINAL SILHOUETTE: Cardiomediastinal silhouette is normal in size and configuration.   LUNGS: Lungs are clear.   ABDOMEN: No remarkable upper abdominal findings.   BONES: No acute osseous changes.       1.  No evidence of acute  cardiopulmonary process.       MACRO: None   Signed by: Joseph Schoenberger 8/11/2024 9:06 AM Dictation workstation:   [id number]       Lab Results  Results for orders placed or performed during the hospital encounter of 08/11/24 (from the past 24 hour(s))   CBC   Result Value Ref Range    WBC 5.7 4.4 - 11.3 x10*3/uL    nRBC 0.0 0.0 - 0.0 /100 WBCs    RBC 5.09 4.50 - 5.90 x10*6/uL    Hemoglobin 15.8 13.5 - 17.5 g/dL    Hematocrit 46.6 41.0 - 52.0 %    MCV 92 80 - 100 fL    MCH 31.0 26.0 - 34.0 pg    MCHC 33.9 32.0 - 36.0 g/dL    RDW 13.8 11.5 - 14.5 %    Platelets 204 150 - 450 x10*3/uL   Basic Metabolic Panel   Result Value Ref Range    Glucose 93 74 - 99 mg/dL    Sodium 138 136 - 145 mmol/L    Potassium 4.1 3.5 - 5.3 mmol/L    Chloride 102 98 - 107 mmol/L    Bicarbonate 29 21 - 32 mmol/L    Anion Gap 11 10 - 20 mmol/L    Urea Nitrogen 18 6 - 23 mg/dL    Creatinine 1.18 0.50 - 1.30 mg/dL    eGFR 73 >60 mL/min/1.73m*2    Calcium 8.8 8.6 - 10.3 mg/dL   Magnesium   Result Value Ref Range    Magnesium 2.11 1.60 - 2.40 mg/dL   B-type Natriuretic Peptide   Result Value Ref Range    BNP 17 0 - 99 pg/mL   Heparin Assay, UFH   Result Value Ref Range    Heparin Unfractionated 0.3 See Comment Below for Therapeutic Ranges IU/mL        Medications  Scheduled medications:  losartan, 50 mg, oral, Daily  pantoprazole, 40 mg, oral, Daily before breakfast   Or  pantoprazole, 40 mg, intravenous, Daily before breakfast  perflutren lipid microspheres, 0.5-10 mL of dilution, intravenous, Once in imaging  perflutren protein A microsphere, 0.5 mL, intravenous, Once in imaging  polyethylene glycol, 17 g, oral, Daily  sulfur hexafluoride microsphr, 2 mL, intravenous, Once in imaging      Continuous medications:  heparin, 0-4,000 Units/hr, Last Rate: 1,200 Units/hr (08/12/24 0955)      PRN medications:  PRN medications: acetaminophen **OR** acetaminophen **OR** acetaminophen, heparin, morphine     Physical Exam  Constitutional:        General: He is not in acute distress.     Appearance: Normal appearance.   HENT:      Head: Normocephalic and atraumatic.      Right Ear: External ear normal.      Left Ear: External ear normal.      Nose: Nose normal.      Mouth/Throat:      Mouth: Mucous membranes are moist.      Pharynx: Oropharynx is clear.   Eyes:      Extraocular Movements: Extraocular movements intact.      Conjunctiva/sclera: Conjunctivae normal.      Pupils: Pupils are equal, round, and reactive to light.   Cardiovascular:      Rate and Rhythm: Normal rate and regular rhythm.      Pulses: Normal pulses.      Heart sounds: Normal heart sounds.   Pulmonary:      Effort: Pulmonary effort is normal. No respiratory distress.      Breath sounds: Normal breath sounds. No wheezing, rhonchi or rales.   Abdominal:      General: Bowel sounds are normal.      Palpations: Abdomen is soft.      Tenderness: There is no abdominal tenderness. There is no right CVA tenderness, left CVA tenderness, guarding or rebound.   Musculoskeletal:         General: No swelling. Normal range of motion.      Cervical back: Normal range of motion and neck supple.   Skin:     General: Skin is warm and dry.      Capillary Refill: Capillary refill takes less than 2 seconds.      Findings: No lesion or rash.   Neurological:      General: No focal deficit present.      Mental Status: He is alert and oriented to person, place, and time. Mental status is at baseline.   Psychiatric:         Mood and Affect: Mood normal.         Behavior: Behavior normal.                  Code Status  Full Code     Assessment/Plan   This patient currently has cardiac telemetry ordered; if you would like to modify or discontinue the telemetry order, click here to go to the orders activity to modify/discontinue the order.  Acute saddle PE with right heart strain  Hep gtt  Montior on tele  Appreciate cardiology recommendations, have referred to PERT team  Hemodynamically stable currently, on RA  Can  likely transition to eliquis tomorrow if no intervention deemed necessary  Referral to pulmonology and hematology outpatient  Could possibly be from recent travel to [place] but this was only a 2 hour flight  For now will classify as unprovoked, could consider hypercoagulable workup  No recent hospitalizations, long travel, surgeries. Up to date on cancer screenings. No family history of bleeding or clotting disorders to his knowledge but did just have an uncle admitted for 6 days which he is trying to figure out what that was for     Elevated troponin 2/2 acute PE with heart strain  As above    HTN  Patient recently changed from amlodipine to losartan  Monitor BP and adjust as necessary    DLD  Nonobstructive CAD    DVT ppx: Heparin drip       Please see orders for more complete plan    Rosa Nuñez PA-C

## 2024-08-13 NOTE — PROGRESS NOTES
Reason for Consult: Pulmonary Embolism     Subjective Data:  Patient reports that he is feeling better. He is still having chest pressure when lying flat. He denies shortness of breath. He walked with physical therapy yesterday and had minimal shortness of breath. He denies black or tarry stools.     Overnight Events:    No acute events. Did not sleep as well last night as the night before.      Objective Data:  Last Recorded Vitals:  Vitals:    08/12/24 1937 08/12/24 2313 08/13/24 0343 08/13/24 0717   BP: (!) 137/94 120/76 132/83 144/87   BP Location: Right arm Right arm Right arm Right arm   Patient Position: Lying Lying Lying Lying   Pulse: 81 74 76 64   Resp: 18 18 17 26   Temp: 36 °C (96.8 °F) 35.8 °C (96.4 °F) 35.4 °C (95.8 °F) 36.6 °C (97.9 °F)   TempSrc: Temporal Temporal Temporal Temporal   SpO2: 95% 91% 97% 97%   Weight:       Height:           Last Labs:  CBC - 8/13/2024:  5:06 AM  5.7 15.8 204    46.6      CMP - 8/13/2024:  5:06 AM  8.8 7.8 14 --- 0.7   _ 4.4 17 45      PTT - 8/11/2024:  8:38 AM  1.0   11.2 31     TROPHS   Date/Time Value Ref Range Status   08/11/2024 09:26  0 - 20 ng/L Final     Comment:     Previous result verified on 8/11/2024 0910 on specimen/case [id number] called with component [facility] for procedure Troponin I, High Sensitivity, Initial with value 251 ng/L.   08/11/2024 08:38  0 - 20 ng/L Final   08/17/2023 02:27 PM 4 0 - 20 ng/L Final     Comment:     .  Less than 99th percentile of normal range cutoff-  Female and children under 18 years old <14 ng/L; Male <21 ng/L: Negative  Repeat testing should be performed if clinically indicated.   .  Female and children under 18 years old 14-50 ng/L; Male 21-50 ng/L:  Consistent with possible cardiac damage and possible increased clinical   risk. Serial measurements may help to assess extent of myocardial damage.   .  >50 ng/L: Consistent with cardiac damage, increased clinical risk and  myocardial infarction. Serial  measurements may help assess extent of   myocardial damage.   .   NOTE: Children less than 1 year old may have higher baseline troponin   levels and results should be interpreted in conjunction with the overall   clinical context.   .  NOTE: Troponin I testing is performed using a different   testing methodology at [facility] than at other   [facility]. Direct result comparisons should only   be made within the same method.     08/17/2023 01:06 PM 5 0 - 20 ng/L Final     Comment:     .  Less than 99th percentile of normal range cutoff-  Female and children under 18 years old <14 ng/L; Male <21 ng/L: Negative  Repeat testing should be performed if clinically indicated.   .  Female and children under 18 years old 14-50 ng/L; Male 21-50 ng/L:  Consistent with possible cardiac damage and possible increased clinical   risk. Serial measurements may help to assess extent of myocardial damage.   .  >50 ng/L: Consistent with cardiac damage, increased clinical risk and  myocardial infarction. Serial measurements may help assess extent of   myocardial damage.   .   NOTE: Children less than 1 year old may have higher baseline troponin   levels and results should be interpreted in conjunction with the overall   clinical context.   .  NOTE: Troponin I testing is performed using a different   testing methodology at [facility] than at [facility]. Direct result comparisons should only   be made within the same method.       BNP   Date/Time Value Ref Range Status   08/13/2024 05:06 AM 17 0 - 99 pg/mL Final   08/11/2024 10:07 AM 77 0 - 99 pg/mL Final     VLDL   Date/Time Value Ref Range Status   08/28/2023 11:00 AM 14 0 - 40 mg/dL Final      Last I/O:  I/O last 3 completed shifts:  In: 480 (5.2 mL/kg) [P.O.:480]  Out: - (0 mL/kg)   Weight: 92.6 kg     Past Cardiology Tests (Last 3 Years):  EKG:  ECG 12 lead 08/11/2024 (Preliminary)      ECG 12 lead 08/11/2024  (Preliminary)    Echo:  Transthoracic Echo (TTE) Complete 08/11/2024    Ejection Fractions:  EF   Date/Time Value Ref Range Status   08/11/2024 12:44 PM 61 %      Cath:  No results found for this or any previous visit from the past 1095 days.    Stress Test:  No results found for this or any previous visit from the past 1095 days.    Cardiac Imaging:  No results found for this or any previous visit from the past 1095 days.      Inpatient Medications:  Scheduled medications   Medication Dose Route Frequency    losartan  50 mg oral Daily    pantoprazole  40 mg oral Daily before breakfast    Or    pantoprazole  40 mg intravenous Daily before breakfast    perflutren lipid microspheres  0.5-10 mL of dilution intravenous Once in imaging    perflutren protein A microsphere  0.5 mL intravenous Once in imaging    polyethylene glycol  17 g oral Daily    sulfur hexafluoride microsphr  2 mL intravenous Once in imaging     PRN medications   Medication    acetaminophen    Or    acetaminophen    Or    acetaminophen    heparin    morphine     Continuous Medications   Medication Dose Last Rate    heparin  0-4,000 Units/hr 1,200 Units/hr (08/12/24 2308)       Physical Exam  Vitals reviewed.   Constitutional:       General: He is not in acute distress.     Appearance: Normal appearance. He is not ill-appearing, toxic-appearing or diaphoretic.   HENT:      Head: Normocephalic.      Nose: Nose normal.      Mouth/Throat:      Mouth: Mucous membranes are moist.   Eyes:      General: No scleral icterus.  Cardiovascular:      Rate and Rhythm: Normal rate and regular rhythm.      Heart sounds: Normal heart sounds.   Pulmonary:      Effort: Pulmonary effort is normal. No respiratory distress.      Breath sounds: Normal breath sounds. No stridor. No rales.      Comments: Chest tightness with deep breath   Chest:      Chest wall: No tenderness.   Musculoskeletal:         General: Normal range of motion.      Right lower leg: No edema.      Left  lower leg: No edema.   Skin:     General: Skin is warm and dry.      Capillary Refill: Capillary refill takes less than 2 seconds.   Neurological:      General: No focal deficit present.      Mental Status: He is alert and oriented to person, place, and time. Mental status is at baseline.      Gait: Gait normal.   Psychiatric:         Mood and Affect: Mood normal.         Behavior: Behavior normal.         Thought Content: Thought content normal.     Patient walked around step down unit 3 times. Pre vitals HR 77, /97, 95% on room air. Post walk vitals: /94, HR 94-97, % on room air during walk. Patient denied chest pain, dizziness and lightheadedness. Pt. Had minimal shortness of breath.      Assessment/Plan   Expand All Collapse All    PROGRESS NOTE     HPI:  Julio Cesar Casey is a 54 y.o. male who presented to the emergency department with shortness of breath and chest heaviness.  Patient states that his symptoms started yesterday around 5 PM.  He states that he was uncomfortable throughout the evening and had difficult time trying to sleep.  Given his persistent symptoms he came to the emergency department for additional evaluation.  BMP shows normal serum sodium and potassium with a serum creatinine of 1.13.  BNP was 77.  Troponin was 251-244.  INR was 1.0.  CBC showed hemoglobin of 17.8.  Chest x-ray showed no acute cardiopulmonary process.  CTA of the chest showed extensive PE with RV strain.  Prior heart catheterization done in August 2023 showed no obstructive CAD.  During my exam the patient was resting comfortably in bed.      Subjective Data:  Patient resting comfortably.  He states that the pressure he was having on his chest when he would lie on his back or sides has improved.  He denies any other chest pain palpitations.  Telemetry sinus rhythm and his blood pressures been stable.  Tolerating heparin without any bleeding complications.  His echocardiogram showed normal LV systolic function  but he is noted to have RV dysfunction.  Will have the PERT team review again today to see if any further intervention is necessary other than heparin.  Again patient is hemodynamically stable with no other new complaints.     Overnight Events:    None     Objective Data:  Last Recorded Vitals:  Vitals          Vitals:     08/11/24 1927 08/11/24 2305 08/12/24 0332 08/12/24 0700   BP: (!) 136/94 120/75 127/82 111/79   BP Location: Right arm Right arm Right arm Right arm   Patient Position: Lying Lying Lying Lying   Pulse: 92 75 73 78   Resp: 18 18 18 18   Temp: 36.2 °C (97.1 °F) 35.6 °C (96 °F) 35.6 °C (96.1 °F) 36.7 °C (98 °F)   TempSrc: Temporal Temporal Temporal Temporal   SpO2: 94% 96% 96% 95%   Weight:     92.6 kg (204 lb 2.3 oz)     Height:                    Last Labs:  CBC - 8/12/2024:  4:40 AM  6.3 16.2 199    47.1       CMP - 8/12/2024:  4:40 AM  8.9 7.8 14 --- 0.7   _ 4.4 17 45       PTT - 8/11/2024:  8:38 AM      1.0   11.2 31      Last I/O:  I/O last 3 completed shifts:  In: 480 (5.2 mL/kg) [P.O.:480]  Out: - (0 mL/kg)   Weight: 92.6 kg      Past Cardiology Tests (Last 3 Years):  Echo: CONCLUSIONS:   1. The left ventricular systolic function is normal, with a Drake's biplane calculated ejection fraction of 61%.   2. There is severely reduced right ventricular systolic function.   3. RV free wall akinesis with sparing of the apex. RV free wall strain significantly reduced at -3.4%.   4. Mildly enlarged right ventricle.           Inpatient Medications:  Scheduled Medications          Scheduled medications   Medication Dose Route Frequency    losartan  50 mg oral Daily    pantoprazole  40 mg oral Daily before breakfast     Or    pantoprazole  40 mg intravenous Daily before breakfast    perflutren lipid microspheres  0.5-10 mL of dilution intravenous Once in imaging    perflutren protein A microsphere  0.5 mL intravenous Once in imaging    polyethylene glycol  17 g oral Daily    sulfur hexafluoride  microsphr  2 mL intravenous Once in imaging            Review of Systems   Constitutional: Negative for fever and malaise/fatigue.   Cardiovascular:  Negative for chest pain, orthopnea and palpitations.   Respiratory:  Negative for shortness of breath and wheezing.    Skin:  Negative for itching and rash.   Gastrointestinal:  Negative for abdominal pain, diarrhea, nausea and vomiting.   Genitourinary:  Negative for dysuria.   Neurological:  Negative for weakness.         Physical Exam  Constitutional:       General: He is not in acute distress.  HENT:      Mouth/Throat:      Mouth: Mucous membranes are moist.   Neck:      Comments: Flat neck veins  Cardiovascular:      Rate and Rhythm: Normal rate and regular rhythm.      Heart sounds: Normal heart sounds. No murmur heard.  Pulmonary:      Effort: Pulmonary effort is normal.      Breath sounds: Normal breath sounds.      Comments: No pleuritic pain with breathing  Abdominal:      General: Abdomen is flat. Bowel sounds are normal.      Palpations: Abdomen is soft.   Musculoskeletal:         General: No swelling.   Skin:     General: Skin is warm and dry.   Neurological:      Mental Status: He is alert and oriented to person, place, and time.   Psychiatric:         Mood and Affect: Mood normal.         ASSESSMENT/PLAN   1.  Pulmonary embolism  Patient presented with shortness of breath and chest heaviness.  CTA of the chest showed saddle PE with evidence for RV strain.  Patient has had stable blood pressures and heart rate.  PERT team contacted recommended admission at  [place], lower extremity ultrasound, stat echocardiogram, lactate, and BNP.  Patient has been started on a heparin infusion for anticoagulation.  8-12-24: Patient doing very well and is hemodynamically stable.  Tolerating heparin without any bleeding complications.  With the RV dysfunction we will have the PERT team review to see if any further intervention is necessary.  Patient states that the  pressure he was having on his chest laying back or on his sides is pretty much resolved and he is not requiring oxygen.  No other new cardiac complaints or concerns and maintaining sinus rhythm at this time.  Patient will need outpatient follow-up possibly with hematology to find out if he has any underlying hypercoagulable state.  States that he has an uncle with issues of blood clots in his legs.  8-13-24 Patient doing well today. Ambulating in the [place] without desaturation. Plan for patient to follow up with Dr. Javier Gerardo as an outpatient. Recommend 3 month repeat echo and CTA of the chest. Patient is referred to hematology of patient choice for unprovoked PE. Okay to transition to OAC. Patient prefers Xarelto for once a day daily dosing. Admits to sometimes forgetting blood pressure medicine.      2.  Hypertension  The patient has a history of hypertension was treated with amlodipine as an outpatient.  Blood pressures under moderate control.  8-12-24: Blood pressure is well-controlled at this time.  No tachycardia has been noted.    3.  Dyslipidemia  Prior cardiac catheterization in August 2023 showed no obstructive CAD.  Statin therapy previously recommended but patient reportedly reluctant to take statin therapy.  Dietary/lifestyle recommendations discussed.     I discussed patient case with Dr. Gerardo and discussed recommendations with attending.            Code Status:  Full Code    I spent 35 minutes in the professional and overall care of this patient.        Elaina Delaney, APRN-CNP

## 2024-08-14 ENCOUNTER — PHARMACY VISIT (OUTPATIENT)
Dept: PHARMACY | Facility: CLINIC | Age: 54
End: 2024-08-14
Payer: COMMERCIAL

## 2024-08-14 VITALS
RESPIRATION RATE: 15 BRPM | BODY MASS INDEX: 27.77 KG/M2 | TEMPERATURE: 97.4 F | DIASTOLIC BLOOD PRESSURE: 66 MMHG | HEART RATE: 68 BPM | SYSTOLIC BLOOD PRESSURE: 108 MMHG | OXYGEN SATURATION: 97 % | WEIGHT: 205.03 LBS | HEIGHT: 72 IN

## 2024-08-14 LAB
ANION GAP SERPL CALC-SCNC: 13 MMOL/L (ref 10–20)
ATRIAL RATE: 91 BPM
ATRIAL RATE: 97 BPM
BUN SERPL-MCNC: 20 MG/DL (ref 6–23)
CALCIUM SERPL-MCNC: 9.2 MG/DL (ref 8.6–10.3)
CHLORIDE SERPL-SCNC: 102 MMOL/L (ref 98–107)
CO2 SERPL-SCNC: 26 MMOL/L (ref 21–32)
CREAT SERPL-MCNC: 1.18 MG/DL (ref 0.5–1.3)
EGFRCR SERPLBLD CKD-EPI 2021: 73 ML/MIN/1.73M*2
ERYTHROCYTE [DISTWIDTH] IN BLOOD BY AUTOMATED COUNT: 13.7 % (ref 11.5–14.5)
GLUCOSE SERPL-MCNC: 85 MG/DL (ref 74–99)
HCT VFR BLD AUTO: 45 % (ref 41–52)
HGB BLD-MCNC: 15.9 G/DL (ref 13.5–17.5)
INR PPP: 1.2 (ref 0.9–1.1)
MAGNESIUM SERPL-MCNC: 2.09 MG/DL (ref 1.6–2.4)
MCH RBC QN AUTO: 31.5 PG (ref 26–34)
MCHC RBC AUTO-ENTMCNC: 35.3 G/DL (ref 32–36)
MCV RBC AUTO: 89 FL (ref 80–100)
NRBC BLD-RTO: 0 /100 WBCS (ref 0–0)
P AXIS: 18 DEGREES
P AXIS: 19 DEGREES
PLATELET # BLD AUTO: 213 X10*3/UL (ref 150–450)
POTASSIUM SERPL-SCNC: 3.7 MMOL/L (ref 3.5–5.3)
PR INTERVAL: 196 MS
PR INTERVAL: 199 MS
PROTHROMBIN TIME: 13.7 SECONDS (ref 9.8–12.8)
Q ONSET: 249 MS
Q ONSET: 251 MS
QRS COUNT: 15 BEATS
QRS COUNT: 16 BEATS
QRS DURATION: 76 MS
QRS DURATION: 81 MS
QT INTERVAL: 351 MS
QT INTERVAL: 353 MS
QTC CALCULATION(BAZETT): 435 MS
QTC CALCULATION(BAZETT): 444 MS
QTC FREDERICIA: 405 MS
QTC FREDERICIA: 410 MS
R AXIS: -15 DEGREES
R AXIS: -15 DEGREES
RBC # BLD AUTO: 5.04 X10*6/UL (ref 4.5–5.9)
SODIUM SERPL-SCNC: 137 MMOL/L (ref 136–145)
T AXIS: 46 DEGREES
T AXIS: 65 DEGREES
T OFFSET: 425 MS
T OFFSET: 427 MS
VENTRICULAR RATE: 91 BPM
VENTRICULAR RATE: 96 BPM
WBC # BLD AUTO: 4.9 X10*3/UL (ref 4.4–11.3)

## 2024-08-14 PROCEDURE — 83735 ASSAY OF MAGNESIUM: CPT | Performed by: PHYSICIAN ASSISTANT

## 2024-08-14 PROCEDURE — 85027 COMPLETE CBC AUTOMATED: CPT | Performed by: PHYSICIAN ASSISTANT

## 2024-08-14 PROCEDURE — 2500000001 HC RX 250 WO HCPCS SELF ADMINISTERED DRUGS (ALT 637 FOR MEDICARE OP): Performed by: PHYSICIAN ASSISTANT

## 2024-08-14 PROCEDURE — 36415 COLL VENOUS BLD VENIPUNCTURE: CPT | Performed by: PHYSICIAN ASSISTANT

## 2024-08-14 PROCEDURE — 2500000001 HC RX 250 WO HCPCS SELF ADMINISTERED DRUGS (ALT 637 FOR MEDICARE OP): Performed by: NURSE PRACTITIONER

## 2024-08-14 PROCEDURE — RXMED WILLOW AMBULATORY MEDICATION CHARGE

## 2024-08-14 PROCEDURE — 99239 HOSP IP/OBS DSCHRG MGMT >30: CPT | Performed by: PHYSICIAN ASSISTANT

## 2024-08-14 PROCEDURE — 99231 SBSQ HOSP IP/OBS SF/LOW 25: CPT | Performed by: NURSE PRACTITIONER

## 2024-08-14 PROCEDURE — 85610 PROTHROMBIN TIME: CPT | Performed by: PHYSICIAN ASSISTANT

## 2024-08-14 PROCEDURE — 80048 BASIC METABOLIC PNL TOTAL CA: CPT | Performed by: PHYSICIAN ASSISTANT

## 2024-08-14 RX ADMIN — APIXABAN 10 MG: 5 TABLET, FILM COATED ORAL at 09:28

## 2024-08-14 RX ADMIN — LOSARTAN POTASSIUM 50 MG: 50 TABLET, FILM COATED ORAL at 09:28

## 2024-08-14 RX ADMIN — PANTOPRAZOLE SODIUM 40 MG: 40 TABLET, DELAYED RELEASE ORAL at 04:11

## 2024-08-14 ASSESSMENT — ENCOUNTER SYMPTOMS
SHORTNESS OF BREATH: 0
CHILLS: 0
CARDIOVASCULAR NEGATIVE: 1
COUGH: 0
APPETITE CHANGE: 0
FATIGUE: 0
CHEST TIGHTNESS: 0
ACTIVITY CHANGE: 0
VOMITING: 0
NAUSEA: 0
MUSCULOSKELETAL NEGATIVE: 1
RESPIRATORY NEGATIVE: 1
PSYCHIATRIC NEGATIVE: 1
NEUROLOGICAL NEGATIVE: 1
FEVER: 0
WHEEZING: 0
ANAL BLEEDING: 0

## 2024-08-14 ASSESSMENT — PAIN - FUNCTIONAL ASSESSMENT
PAIN_FUNCTIONAL_ASSESSMENT: 0-10

## 2024-08-14 ASSESSMENT — PAIN SCALES - GENERAL
PAINLEVEL_OUTOF10: 0 - NO PAIN

## 2024-08-14 NOTE — DISCHARGE SUMMARY
Admission Date: 8/11/2024  8:18 AM  Discharge Date: 08/14/24   Condition at discharge: Stable    Discharge Diagnosis  Acute submassive unprovoked saddle PE with evidence of RV dysfunction  Elevated troponin 2/2 acute PE with heart strain  HTN- Patient recently changed from amlodipine to losartan  DLD  Nonobstructive CAD    Test Results Pending At Discharge  Pending Labs       No current pending labs.            Hospital Course   Julio Cesar Casey is a 54 y.o. male with PMHx s/f HTN, nonobstructive CAD presented 8/12/24 with Chest pain. The patient presented to emergency department with complaint of heaviness in the chest shortness of breath some chest discomfort starting yesterday. Patient states that symptoms were worse when he gets up and moves he even just going to the bathroom and back patient had developed some shortness of breath and chest heaviness. At rest the patient is not having any chest heaviness. Patient reports a prior history of cardiac cath they told him he had only like 10% blockage. Patient has not had any recent surgery he did have a trip to [place] and back in June. He denies any fevers chills rigors any purulent sputum any hemoptysis any leg pain. Troponin initially 250 1 repeat troponin 244 BN peptide 77 lactate 1.0. EKG showing sinus rhythm no intraventricular conduction delay no ST elevation. CTA of the chest showed saddle PE with evidence for RV strain. The ED provider contacted the PERT team. Given that patient was normotensive heart rate was around 100 was advised to check additional lab studies which have been relatively unremarkable. Patient was felt not to need emergent intervention from the PERT team. Echo: EF 61%, severely reduced right ventricular systolic function, RV free wall akinesis with sparing of the apex, RV free wall strain significantly reduced at -3.4%, mildly enlarged right ventricle. US BLE without DVT.  Patient seen by cardiology and interventional radiology (PERT), no  intervention necessary is hemodynamically stable and is now asymptomatic.  Patient had discussion with pharmacy regarding differences between Eliquis and Xarelto and has chosen to go forward with Eliquis, he was started on Eliquis 8/13/2024 and is tolerating well without any return of symptoms and stable vitals.  He will be discharged home today.  He will have close outpatient follow-up with Dr. Gerardo, pulmonology, referral also placed to hematology for further workup of unprovoked PE.  Patient ambulating independently in the room, wife at bedside and all questions answered    Consultations: Cardiology consulted- treatment options were discussed and plan of care agreed upon.    Pertinent Physical Exam At Time of Discharge  Physical Exam  Constitutional:       General: He is not in acute distress.     Appearance: Normal appearance.      Comments: Patient ambulating independently in the room   HENT:      Head: Normocephalic and atraumatic.      Right Ear: External ear normal.      Left Ear: External ear normal.      Nose: Nose normal.      Mouth/Throat:      Mouth: Mucous membranes are moist.      Pharynx: Oropharynx is clear.   Eyes:      Extraocular Movements: Extraocular movements intact.      Conjunctiva/sclera: Conjunctivae normal.      Pupils: Pupils are equal, round, and reactive to light.   Cardiovascular:      Rate and Rhythm: Normal rate and regular rhythm.      Pulses: Normal pulses.      Heart sounds: Normal heart sounds.   Pulmonary:      Effort: Pulmonary effort is normal. No respiratory distress.      Breath sounds: Normal breath sounds. No wheezing, rhonchi or rales.   Abdominal:      General: Bowel sounds are normal.      Palpations: Abdomen is soft.      Tenderness: There is no abdominal tenderness. There is no right CVA tenderness, left CVA tenderness, guarding or rebound.   Musculoskeletal:         General: No swelling. Normal range of motion.      Cervical back: Normal range of motion and neck  supple.   Skin:     General: Skin is warm and dry.      Capillary Refill: Capillary refill takes less than 2 seconds.      Findings: No lesion or rash.   Neurological:      General: No focal deficit present.      Mental Status: He is alert and oriented to person, place, and time. Mental status is at baseline.   Psychiatric:         Mood and Affect: Mood normal.         Behavior: Behavior normal.         Code Status  Full Code     Home Medications     Medication List      START taking these medications     * Eliquis DVT-PE Treat 30D Start 5 mg (74 tabs) tablet; Generic drug:   apixaban; Take 2 tablets (10 mg) by mouth 2 times a day for 6 days, THEN 1   tablet (5 mg) 2 times a day for 25 days.; Start taking on: August 14, 2024   * apixaban 5 mg tablet; Commonly known as: Eliquis; Take 1 tablet (5 mg)   by mouth 2 times a day. Do not fill before September 13, 2024.; Start   taking on: September 13, 2024  * This list has 2 medication(s) that are the same as other medications   prescribed for you. Read the directions carefully, and ask your doctor or   other care provider to review them with you.     CONTINUE taking these medications     losartan 50 mg tablet; Commonly known as: Cozaar     STOP taking these medications     amLODIPine 10 mg tablet; Commonly known as: Norvasc       Outpatient Follow-Up  Future Appointments   Date Time Provider Department [place]   8/20/2024  3:15 PM Javier Gerardo MD [id number] [facility]   8/30/2024  9:40 AM KISHOR Aldana-CNP [id number] [facility]         At the time of discharge, patient's pain was controlled with oral analgesia, patient was urinating, having BMs, sleeping, and eating well. Follow up recommendations are in discharge paperwork. Discharge plan was discussed with the patient/family and all of the questions were answered. Medications were ordered to be delivered to bedside prior to discharge.     Discharge planning took greater than 35 minutes    Diagnoses at time of  discharge:  Acute submassive unprovoked saddle PE with evidence of RV dysfunction  Elevated troponin 2/2 acute PE with heart strain  HTN- Patient recently changed from amlodipine to losartan  DLD  Nonobstructive CAD    Anticipated discharge destination: home    Please see orders for more complete plan    Rosa Nuñez PA-C

## 2024-08-14 NOTE — DOCUMENTATION CLARIFICATION NOTE
"    PATIENT:               IDALIA CASEY  ACCT #:                  [id number]  MRN:                       [id number]  :                       1970  ADMIT DATE:       2024 8:18 AM  DISCH DATE:        2024 12:46 PM  RESPONDING PROVIDER #:        [id number]          PROVIDER RESPONSE TEXT:    Pulmonary embolus with Acute Cor Pulmonale    CDI QUERY TEXT:    Clarification      Instruction:    Based on your assessment of the patient and the clinical information, please provide the requested documentation by clicking on the appropriate radio button and enter any additional information if prompted.    Question: Is there a diagnosis related to the diagnosis of PE    When answering this query, please exercise your independent professional judgment. The fact that a question is being asked, does not imply that any particular answer is desired or expected.    The patient's clinical indicators include:  Clinical Information:  - 53yo male admitted with chest pain, shortness of breath and diagnosed with Nstemi and PE. PMH includes HTN, HLD, and nonobstructive CAD.    Clinical Indicators:  - VS on arrival: 97.6, hr 98, rr 20, bp 138/87, O2 sat 98% on room air  - Troponin  : 251 and 244  - BNP  : 77  - Chest Ct : Extensive pulmonary emboli with right ventricular strain as detailed above. Cholelithiasis without acute cholecystitis.    - CC Dr Martin : \" Idalia Casey is a 54 y.o. male presenting with shortness of breath since last night. Was out at dinner with wife then went on short walk and was feeling more dyspneic. This morning still feeling SOB so went to ED for evaluation. CTPE performed which showed extensive pulmonary emboli including saddle PE.\"  and  \" Clot burden extensive, present in bilateral main pulmonary arteries and saddle PE. RV/LV ratio 2/1 by CT scan \"    - Edwardo Nuñez  : \" Elevated troponin 2/2 acute PE with heart strain\"    Treatment: Heparin drip, Eliquis, PERT team consult, Cardiology " consult.    Risk Factors: Saddle PE, elevated troponin.  Options provided:  -- Pulmonary embolus with Acute Cor Pulmonale  -- Pulmonary embolus without Acute Cor Pulmonale  -- Other - I will add my own diagnosis  -- Refer to Clinical Documentation Reviewer    Query created by: Candelaria Rae on 8/14/2024 12:18 PM      Electronically signed by:  MALCOLM LLAMAS PA-C 8/14/2024 2:17 PM

## 2024-08-14 NOTE — CARE PLAN
Problem: Pain  Goal: Takes deep breaths with improved pain control throughout the shift  Outcome: Progressing  Goal: Turns in bed with improved pain control throughout the shift  Outcome: Progressing     Problem: Respiratory  Goal: Clear secretions with interventions this shift  Outcome: Progressing  Goal: Minimize anxiety/maximize coping throughout shift  Outcome: Progressing

## 2024-08-14 NOTE — DOCUMENTATION CLARIFICATION NOTE
"    PATIENT:               IDALIA CASEY  ACCT #:                  [id number]  MRN:                       [id number]  :                       1970  ADMIT DATE:       2024 8:18 AM  DISCH DATE:        2024 12:46 PM  RESPONDING PROVIDER #:        [id number]          PROVIDER RESPONSE TEXT:    Elevated troponin    CDI QUERY TEXT:    Clarification      Instruction:    Based on your assessment of the patient and the clinical information, please provide the requested documentation by clicking on the appropriate radio button and enter any additional information if prompted.    Question: Is there a diagnosis indicative of the patient elevated Troponins and symptoms    When answering this query, please exercise your independent professional judgment. The fact that a question is being asked, does not imply that any particular answer is desired or expected.    The patient's clinical indicators include:  Clinical Information:  - 55yo male admitted with chest pain, shortness of breath and diagnosed with Nstemi and PE. PMH includes HTN, HLD, and nonobstructive CAD.    Clinical Indicators:  - VS on arrival: 97.6, hr 98, rr 20, bp 138/87, O2 sat 98% on room air  - Troponin  : 251 and 244  - BNP  : 77  - Chest Ct : Extensive pulmonary emboli with right ventricular strain as detailed above. Cholelithiasis without acute cholecystitis.    - CC Dr Martin : \" Idalia Casey is a 54 y.o. male presenting with shortness of breath since last night. Was out at dinner with wife then went on short walk and was feeling more dyspneic. This morning still feeling SOB so went to ED for evaluation. CTPE performed which showed extensive pulmonary emboli including saddle PE.\"  and  \" Clot burden extensive, present in bilateral main pulmonary arteries and saddle PE. RV/LV ratio 2/1 by CT scan \"    - Edwardo Nuñez  :  \" Day 2 of admission presenting with Nstemi\" and  \" Elevated troponin 2/2 acute PE with heart strain\"    Treatment: " Heparin drip, Eliquis, PERT team consult, Cardiology consult.    Risk Factors: Saddle PE, elevated troponin.  Options provided:  -- NSTEMI  -- Type II MI  -- Acute Myocardial Injury  -- Other - I will add my own diagnosis  -- Refer to Clinical Documentation Reviewer    Query created by: Candelaria Rae on 8/14/2024 12:20 PM      Electronically signed by:  MALCOLM LLAMAS PA-C 8/14/2024 2:17 PM

## 2024-08-14 NOTE — PROGRESS NOTES
Subjective Data:  Patient did well overnight. Denies cheat pain. Notes reviewed from [facility]. Vitals stable and blood work reviewed. Patient is seated upright in the chair. Wife is at bedside. Awaiting discharge paperwork.     Review of Systems   Constitutional:  Negative for activity change, appetite change, chills, fatigue and fever.   Respiratory: Negative.  Negative for cough, chest tightness, shortness of breath and wheezing.    Cardiovascular: Negative.  Negative for chest pain.   Gastrointestinal:  Negative for anal bleeding, nausea and vomiting.   Musculoskeletal: Negative.    Skin: Negative.    Neurological: Negative.    Psychiatric/Behavioral: Negative.       Overnight Events:    No acute events.      Objective Data:  Last Recorded Vitals:  Vitals:    08/14/24 0321 08/14/24 0807 08/14/24 1123 08/14/24 1128   BP: 139/83 136/89 (!) 142/91 108/66   BP Location: Right arm Right arm Left arm    Patient Position: Lying Lying Sitting Lying   Pulse: 75 63 66 68   Resp: 18 18 18 15   Temp: 36.1 °C (97 °F) 36.7 °C (98 °F) 36.4 °C (97.5 °F) 36.3 °C (97.4 °F)   TempSrc: Temporal Temporal  Temporal   SpO2: 97% 97% 97% 97%   Weight: 93 kg (205 lb 0.4 oz)      Height:           Last Labs:  CBC - 8/14/2024:  4:10 AM  4.9 15.9 213    45.0      CMP - 8/14/2024:  4:10 AM  9.2 7.8 14 --- 0.7   _ 4.4 17 45      PTT - 8/11/2024:  8:38 AM  1.2   13.7 31     TROPHS   Date/Time Value Ref Range Status   08/11/2024 09:26  0 - 20 ng/L Final     Comment:     Previous result verified on 8/11/2024 0910 on specimen/case [id number] called with component [facility], [place] for procedure Troponin I, High Sensitivity, Initial with value 251 ng/L.   08/11/2024 08:38  0 - 20 ng/L Final   08/17/2023 02:27 PM 4 0 - 20 ng/L Final     Comment:     .  Less than 99th percentile of normal range cutoff-  Female and children under 18 years old <14 ng/L; Male <21 ng/L: Negative  Repeat testing should be performed if clinically indicated.   .  Female and  children under 18 years old 14-50 ng/L; Male 21-50 ng/L:  Consistent with possible cardiac damage and possible increased clinical   risk. Serial measurements may help to assess extent of myocardial damage.   .  >50 ng/L: Consistent with cardiac damage, increased clinical risk and  myocardial infarction. Serial measurements may help assess extent of   myocardial damage.   .   NOTE: Children less than 1 year old may have higher baseline troponin   levels and results should be interpreted in conjunction with the overall   clinical context.   .  NOTE: Troponin I testing is performed using a different   testing methodology at [facility] than at other   [facility]. Direct result comparisons should only   be made within the same method.     08/17/2023 01:06 PM 5 0 - 20 ng/L Final     Comment:     .  Less than 99th percentile of normal range cutoff-  Female and children under 18 years old <14 ng/L; Male <21 ng/L: Negative  Repeat testing should be performed if clinically indicated.   .  Female and children under 18 years old 14-50 ng/L; Male 21-50 ng/L:  Consistent with possible cardiac damage and possible increased clinical   risk. Serial measurements may help to assess extent of myocardial damage.   .  >50 ng/L: Consistent with cardiac damage, increased clinical risk and  myocardial infarction. Serial measurements may help assess extent of   myocardial damage.   .   NOTE: Children less than 1 year old may have higher baseline troponin   levels and results should be interpreted in conjunction with the overall   clinical context.   .  NOTE: Troponin I testing is performed using a different   testing methodology at [facility] than at other   [facility]. Direct result comparisons should only   be made within the same method.       BNP   Date/Time Value Ref Range Status   08/13/2024 05:06 AM 17 0 - 99 pg/mL Final   08/11/2024 10:07 AM 77 0 - 99 pg/mL Final     VLDL   Date/Time Value Ref  Range Status   08/28/2023 11:00 AM 14 0 - 40 mg/dL Final      Last I/O:  I/O last 3 completed shifts:  In: 526.8 (5.7 mL/kg) [I.V.:526.8 (5.7 mL/kg)]  Out: - (0 mL/kg)   Weight: 93 kg     Past Cardiology Tests (Last 3 Years):  EKG:  ECG 12 lead 08/11/2024 (Preliminary)      ECG 12 lead 08/11/2024 (Preliminary)    Echo:  Transthoracic Echo (TTE) Complete 08/11/2024    Ejection Fractions:  EF   Date/Time Value Ref Range Status   08/11/2024 12:44 PM 61 %          Inpatient Medications:  Scheduled medications   Medication Dose Route Frequency    apixaban  10 mg oral BID    Followed by    [START ON 8/20/2024] apixaban  5 mg oral BID    losartan  50 mg oral Daily    pantoprazole  40 mg oral Daily before breakfast    Or    pantoprazole  40 mg intravenous Daily before breakfast    perflutren lipid microspheres  0.5-10 mL of dilution intravenous Once in imaging    perflutren protein A microsphere  0.5 mL intravenous Once in imaging    polyethylene glycol  17 g oral Daily    sulfur hexafluoride microsphr  2 mL intravenous Once in imaging     PRN medications   Medication    acetaminophen    Or    acetaminophen    Or    acetaminophen    morphine     Continuous Medications   Medication Dose Last Rate     Physical Exam  Vitals reviewed.   Constitutional:       General: He is not in acute distress.     Appearance: Normal appearance. He is not ill-appearing, toxic-appearing or diaphoretic.   HENT:      Head: Normocephalic.      Nose: Nose normal.   Eyes:      General: No scleral icterus.     Extraocular Movements: Extraocular movements intact.   Cardiovascular:      Rate and Rhythm: Normal rate and regular rhythm.      Pulses: Normal pulses.      Heart sounds: Normal heart sounds. No murmur heard.  Pulmonary:      Effort: Pulmonary effort is normal. No respiratory distress.      Breath sounds: Normal breath sounds. No wheezing, rhonchi or rales.   Musculoskeletal:      Right lower leg: No edema.      Left lower leg: No edema.    Skin:     General: Skin is warm and dry.      Capillary Refill: Capillary refill takes less than 2 seconds.   Neurological:      General: No focal deficit present.      Mental Status: He is alert and oriented to person, place, and time.      Motor: No weakness.   Psychiatric:         Mood and Affect: Mood normal.         Behavior: Behavior normal.         Thought Content: Thought content normal.         Judgment: Judgment normal.          Assessment/Plan   ASSESSMENT/PLAN   1.  Pulmonary embolism  Patient presented with shortness of breath and chest heaviness.  CTA of the chest showed saddle PE with evidence for RV strain.  Patient has had stable blood pressures and heart rate.  PERT team contacted recommended admission at  [facility], lower extremity ultrasound, stat echocardiogram, lactate, and BNP.  Patient has been started on a heparin infusion for anticoagulation.  8-12-24: Patient doing very well and is hemodynamically stable.  Tolerating heparin without any bleeding complications.  With the RV dysfunction we will have the PERT team review to see if any further intervention is necessary.  Patient states that the pressure he was having on his chest laying back or on his sides is pretty much resolved and he is not requiring oxygen.  No other new cardiac complaints or concerns and maintaining sinus rhythm at this time.  Patient will need outpatient follow-up possibly with hematology to find out if he has any underlying hypercoagulable state.  States that he has an uncle with issues of blood clots in his legs.  8-13-24 Patient doing well today. Ambulating in the [place] without desaturation. Plan for patient to follow up with Dr. Javier Gerardo as an outpatient. Recommend 3 month repeat echo and CTA of the chest. Patient is referred to hematology of patient choice for unprovoked PE. Okay to transition to OAC. Patient prefers Xarelto for once a day daily dosing. Admits to sometimes forgetting blood pressure medicine.    8-14-24 patient did well overnight. No acute events. Denies shortness of breath. After discussing OAC options, will go with Eliquis as OAC choice. Started last night and tolerating well. Patient will follow up with Dr. Gerardo in 1-2 weeks. Consider repeat CTA of the chest and echo in 3 months s/p hospitalization. Records were sent to Hematoligist, [facility] [place] for thrombophilia workup. He will follow up with pulmonary as well.      2.  Hypertension  The patient has a history of hypertension was treated with amlodipine as an outpatient.  Blood pressures under moderate control.  8-12-24: Blood pressure is well-controlled at this time.  No tachycardia has been noted.     3.  Dyslipidemia  Prior cardiac catheterization in August 2023 showed no obstructive CAD.  Statin therapy previously recommended but patient reportedly reluctant to take statin therapy.  Dietary/lifestyle recommendations discussed.     I discussed patient case with Dr. Gerardo and discussed recommendations with attending.                Code Status:  Full Code     I spent 20 minutes in the professional and overall care of this patient.             Elaina Delaney, APRN-CNP

## 2024-08-15 ENCOUNTER — TELEPHONE (OUTPATIENT)
Dept: CARDIOLOGY | Facility: HOSPITAL | Age: 54
End: 2024-08-15
Payer: COMMERCIAL

## 2024-08-15 PROBLEM — R07.89 OTHER CHEST PAIN: Status: ACTIVE | Noted: 2023-08-17

## 2024-08-15 PROBLEM — R94.39 ABNORMAL RESULT OF OTHER CARDIOVASCULAR FUNCTION STUDY: Status: ACTIVE | Noted: 2023-08-28

## 2024-08-15 PROBLEM — I10 ESSENTIAL (PRIMARY) HYPERTENSION: Status: ACTIVE | Noted: 2023-08-28

## 2024-08-15 PROBLEM — R06.02 SHORTNESS OF BREATH: Status: ACTIVE | Noted: 2023-08-28

## 2024-08-15 RX ORDER — CARVEDILOL 6.25 MG/1
6.25 TABLET ORAL
COMMUNITY
Start: 2023-08-23 | End: 2024-08-20 | Stop reason: WASHOUT

## 2024-08-15 RX ORDER — TRIAMCINOLONE ACETONIDE 40 MG/ML
INJECTION, SUSPENSION INTRA-ARTICULAR; INTRAMUSCULAR
COMMUNITY
Start: 2024-06-13 | End: 2024-08-20 | Stop reason: WASHOUT

## 2024-08-15 RX ORDER — ALBUTEROL SULFATE 90 UG/1
2 INHALANT RESPIRATORY (INHALATION) EVERY 4 HOURS PRN
COMMUNITY

## 2024-08-15 RX ORDER — ASPIRIN 81 MG/1
1 TABLET ORAL DAILY
COMMUNITY
End: 2024-08-20 | Stop reason: WASHOUT

## 2024-08-15 NOTE — TELEPHONE ENCOUNTER
8/15/24  1221  Transitional Care Management Note    Discharge Facility: [facility]  Discharge Diagnosis: Elevated troponin secondary to PE with heart strain, Acute submassive unprovoked saddle PE with evidence of rv dysfunction, HTN, DLD, non nonobstructive CAD  Admission Date: 8/11/24  Discharge Date: 8/14/24  Discharge Physician: LEONARD Hayes     PCP Appointment Date:   Specialist Appointment Date (must be with in 14 days of discharge): Dr. Gerardo 8/20/24 @1510  Hospital Encounter and Summary Reviewed: Yes    Engagement  Initial Post-Discharge Communication (or 2 attempts) with in 2 business days post D/C  Call Start Time: 8/15/24 @ 1206     Medications  Medications reviewed with patient/caregiver?: Yes  Is the patient having any side effects they believe may be caused by any medication additions or changes?: No  Does the patient have all medications ordered at discharge?: Yes  Is the patient taking all medications as directed (includes completed medication regime)?: Yes  Medication Comments:      Appointments  Does the patient have a primary care provider?: Yes  Care Management Interventions: N/A     Self Management  What is the home health agency?: N/A  What Durable Medical Equipment (DME) was ordered?: N/A  Is patient independent with activities of daily living?Yes  If not, are they getting the assistance they need?N/A     Patient Teaching  Any restrictions on discharge? As Tolerated  Assessment of any procedure sites or wounds:NA  What is the patient's perception of their health status since discharge?: Does tire after activity.    Current Outpatient Medications   Medication Instructions    albuterol 90 mcg/actuation inhaler 2 puffs, inhalation, Every 4 hours PRN    apixaban (Eliquis) 5 mg (74 tabs) tablet Take 2 tablets (10 mg) by mouth 2 times a day for 6 days, THEN 1 tablet (5 mg) 2 times a day for 25 days.    [START ON 9/13/2024] apixaban (ELIQUIS) 5 mg, oral, 2 times daily    aspirin 81 mg EC tablet 1  tablet, oral, Daily    carvedilol (COREG) 6.25 mg, oral, 2 times daily (morning and late afternoon)    losartan (COZAAR) 50 mg, oral, Daily    triamcinolone acetonide (Kenalog) 40 mg/mL injection Take 2 mL by injection route.        Allergies   Allergen Reactions    Penicillins Rash     Per allergy testing      Did discuss with tami c/o his smart watch saying he is in AF-asked him if he wanted to come in for an EKG and patient reported no that his device was not always accurate.    Informed patient to seek immediate medical attention for CP, SOB, dizziness or if he falls and hits his head, to monitor his pulse ox 2-3 times per day    Patient verbalized understanding.        ----- Message from Pasquale HARO sent at 8/13/2024 10:07 AM EDT -----  Regarding: TCM VISIT  Being discharged tomorrow TCM is on 8/20 at 3:15pm

## 2024-08-20 ENCOUNTER — APPOINTMENT (OUTPATIENT)
Dept: CARDIOLOGY | Facility: CLINIC | Age: 54
End: 2024-08-20
Payer: COMMERCIAL

## 2024-08-20 VITALS
BODY MASS INDEX: 28.04 KG/M2 | SYSTOLIC BLOOD PRESSURE: 132 MMHG | HEIGHT: 72 IN | OXYGEN SATURATION: 94 % | DIASTOLIC BLOOD PRESSURE: 96 MMHG | HEART RATE: 96 BPM | WEIGHT: 207 LBS

## 2024-08-20 DIAGNOSIS — I10 ESSENTIAL (PRIMARY) HYPERTENSION: ICD-10-CM

## 2024-08-20 DIAGNOSIS — I25.10 CORONARY ARTERY DISEASE INVOLVING NATIVE CORONARY ARTERY OF NATIVE HEART WITHOUT ANGINA PECTORIS: ICD-10-CM

## 2024-08-20 DIAGNOSIS — I26.02 CHRONIC SADDLE PULMONARY EMBOLISM WITH ACUTE COR PULMONALE (MULTI): Primary | ICD-10-CM

## 2024-08-20 DIAGNOSIS — I51.7 ENLARGED RV (RIGHT VENTRICLE): ICD-10-CM

## 2024-08-20 DIAGNOSIS — I27.82 CHRONIC SADDLE PULMONARY EMBOLISM WITH ACUTE COR PULMONALE (MULTI): Primary | ICD-10-CM

## 2024-08-20 PROCEDURE — 3075F SYST BP GE 130 - 139MM HG: CPT | Performed by: STUDENT IN AN ORGANIZED HEALTH CARE EDUCATION/TRAINING PROGRAM

## 2024-08-20 PROCEDURE — 3008F BODY MASS INDEX DOCD: CPT | Performed by: STUDENT IN AN ORGANIZED HEALTH CARE EDUCATION/TRAINING PROGRAM

## 2024-08-20 PROCEDURE — 99215 OFFICE O/P EST HI 40 MIN: CPT | Performed by: STUDENT IN AN ORGANIZED HEALTH CARE EDUCATION/TRAINING PROGRAM

## 2024-08-20 PROCEDURE — 1036F TOBACCO NON-USER: CPT | Performed by: STUDENT IN AN ORGANIZED HEALTH CARE EDUCATION/TRAINING PROGRAM

## 2024-08-20 PROCEDURE — 3080F DIAST BP >= 90 MM HG: CPT | Performed by: STUDENT IN AN ORGANIZED HEALTH CARE EDUCATION/TRAINING PROGRAM

## 2024-08-20 PROCEDURE — 93000 ELECTROCARDIOGRAM COMPLETE: CPT | Performed by: STUDENT IN AN ORGANIZED HEALTH CARE EDUCATION/TRAINING PROGRAM

## 2024-08-20 NOTE — PROGRESS NOTES
[facility] V[facility] Cardiology Clinic Note    Date: 08/20/24  Time: 4:18 PM    Subjective   Julio Cesar Casey is a 54 y.o. male with PMHx s/f HTN, nonobstructive CAD who is coming to clinic for follow-up visit after recent hospitalization.  Patient presented to the hospital due to on 8/12/24 with Chest discomfort/shortness of breath. The patient presented to emergency department with complaint of heaviness in the chest shortness of breath some chest discomfort. CTA of the chest showed bilateral PE with evidence for RV strain. The ED provider contacted the PERT team. Given that patient was normotensive heart rate was around 100 was advised to check additional lab studies which have been relatively unremarkable. Patient was felt not to need emergent intervention from the PERT team. Echo: EF 61%, severely reduced right ventricular systolic function, RV free wall akinesis with sparing of the apex, RV free wall strain significantly reduced at -3.4%, mildly enlarged right ventricle. US BLE without DVT.  Patient continued to improve in symptoms throughout the stay and denied any dyspnea.  Patient was also walked in the hallway and his oxygen saturation remained within normal limits and he did not develop any worsening dyspnea.  His Pasi score was also on lower side.  Given all of these patient was continued on anticoagulation.  Patient is going to follow-up with hematology.  No clear provoking factors for his PE.    Patient was taking aspirin in the past but then stopped taking.  He will not take statin.        Review of Systems:  Otherwise, limited cardiovascular review of systems is negative.        Medical History:   He has no past medical history on file.  Surgical History:   History reviewed. No pertinent surgical history.PSHP@  Social History:   Social Determinants of Health with Concerns     Alcohol Use: Alcohol Misuse (8/11/2024)    AUDIT-C     Frequency of Alcohol Consumption: 4 or more times a week      Average Number of Drinks: 1 or 2     Frequency of Binge Drinking: Less than monthly   Food Insecurity: Not on file   Physical Activity: Not on file   Stress: Not on file   Social Connections: Not on file   Intimate Partner Violence: Not on file   Utilities: Not on file   Digital Equity: Not on file   Health Literacy: Not on file     Family History:   Family History   Problem Relation Name Age of Onset    Cancer Mother Do paz     Hypertension Mother Do bailonr     Cancer Father Alexi paz     Hypertension Father Alexi paz       Allergies:  Penicillins    Outpatient Medications:  Current Outpatient Medications   Medication Instructions    albuterol 90 mcg/actuation inhaler 2 puffs, inhalation, Every 4 hours PRN    apixaban (Eliquis) 5 mg (74 tabs) tablet Take 2 tablets (10 mg) by mouth 2 times a day for 6 days, THEN 1 tablet (5 mg) 2 times a day for 25 days.    [START ON 9/13/2024] apixaban (ELIQUIS) 5 mg, oral, 2 times daily    losartan (COZAAR) 50 mg, oral, Daily       Objective     Physical Exam  Vitals:    08/20/24 1505   BP: (!) 132/96   BP Location: Left arm   Patient Position: Sitting   BP Cuff Size: Adult   Pulse: 96   SpO2: 94%   Weight: 93.9 kg (207 lb)   Height: 1.829 m (6')     Wt Readings from Last 3 Encounters:   08/20/24 93.9 kg (207 lb)   08/14/24 93 kg (205 lb 0.4 oz)   09/12/23 98.6 kg (217 lb 5 oz)       General: Alert and Oriented, No distress, cooperative  Head: Normocephalic without obvious abnormality, atraumatic  Eyes: Conjunctiva/corneas clear, EOM's grossly intact  Neck: Supple, trachea midline, No thyroid enlargement/tenderness/nodules; No JVD  Lungs: Clear to auscultation bilaterally, no wheezes, rhonci, or rales. respirations unlabored  Chest Wall: No tenderness or deformity  Heart: Regular rhythm, normal S1/S2, no murmur  Abdomen: Soft, non-tender, Non-distended, bowel sounds active  Extremities: No edema, no cyanosis, no clubbing  Skin: Skin color, texture, turgor normal.  No rashes or  lesions noted  Neurologic: Alert and oriented x 3, grossly moving all extremities, speech intact        I have personally reviewed the following images and laboratory findings:  ECG: NSR, possible septal infarct  Echocardiogram:     PHYSICIAN INTERPRETATION:  Left Ventricle: The left ventricular systolic function is normal, with a Drake's biplane calculated ejection fraction of 61%. There are no regional wall motion abnormalities. The left ventricular cavity size is normal. Left Ventricular Global Longitudinal Strain - 6.1 %. Spectral Doppler shows a normal pattern of left ventricular diastolic filling.  Left Atrium: The left atrium is normal in size.  Right Ventricle: The right ventricle is mildly enlarged. There is severely reduced right ventricular systolic function. RV free wall akinesis with sparing of the apex. RV free wall strain significantly reduced at -3.4%.  Right Atrium: The right atrium is normal in size.  Aortic Valve: The aortic valve is trileaflet. The aortic valve dimensionless index is 0.80. There is no evidence of aortic valve regurgitation. The peak instantaneous gradient of the aortic valve is 4.4 mmHg. The mean gradient of the aortic valve is 3.0 mmHg.  Mitral Valve: The mitral valve is normal in structure. There is no evidence of mitral valve regurgitation.  Tricuspid Valve: The tricuspid valve is structurally normal. There is trace tricuspid regurgitation.  Pulmonic Valve: The pulmonic valve is structurally normal. There is physiologic pulmonic valve regurgitation.  Pericardium: There is no pericardial effusion noted.  Aorta: The aortic root is normal. There is mild dilatation of the ascending aorta.        CONCLUSIONS:   1. The left ventricular systolic function is normal, with a Drake's biplane calculated ejection fraction of 61%.   2. There is severely reduced right ventricular systolic function.   3. RV free wall akinesis with sparing of the apex. RV free wall strain significantly  reduced at -3.4%.   4. Mildly enlarged right ventricle.      Laboratory values:   Admission on 08/11/2024, Discharged on 08/14/2024   Component Date Value    WBC 08/11/2024 8.9     nRBC 08/11/2024 0.0     RBC 08/11/2024 5.62     Hemoglobin 08/11/2024 17.8 (H)     Hematocrit 08/11/2024 51.3     MCV 08/11/2024 91     MCH 08/11/2024 31.7     MCHC 08/11/2024 34.7     RDW 08/11/2024 14.4     Platelets 08/11/2024 208     Neutrophils % 08/11/2024 72.9     Immature Granulocytes %,* 08/11/2024 0.4     Lymphocytes % 08/11/2024 18.2     Monocytes % 08/11/2024 7.5     Eosinophils % 08/11/2024 0.8     Basophils % 08/11/2024 0.2     Neutrophils Absolute 08/11/2024 6.47     Immature Granulocytes Ab* 08/11/2024 0.04     Lymphocytes Absolute 08/11/2024 1.62     Monocytes Absolute 08/11/2024 0.67     Eosinophils Absolute 08/11/2024 0.07     Basophils Absolute 08/11/2024 0.02     Glucose 08/11/2024 112 (H)     Sodium 08/11/2024 136     Potassium 08/11/2024 4.1     Chloride 08/11/2024 102     Bicarbonate 08/11/2024 27     Anion Gap 08/11/2024 11     Urea Nitrogen 08/11/2024 15     Creatinine 08/11/2024 1.13     eGFR 08/11/2024 77     Calcium 08/11/2024 9.7     Albumin 08/11/2024 4.4     Alkaline Phosphatase 08/11/2024 45     Total Protein 08/11/2024 7.8     AST 08/11/2024 14     Bilirubin, Total 08/11/2024 0.7     ALT 08/11/2024 17     Magnesium 08/11/2024 2.06     Ventricular Rate 08/11/2024 91     Atrial Rate 08/11/2024 91     NV Interval 08/11/2024 199     QRS Duration 08/11/2024 76     QT Interval 08/11/2024 353     QTC Calculation(Bazett) 08/11/2024 435     P Axis 08/11/2024 18     R Axis 08/11/2024 -15     T Axis 08/11/2024 46     QRS Count 08/11/2024 15     Q Onset 08/11/2024 251     T Offset 08/11/2024 427     QTC Fredericia 08/11/2024 405     Ventricular Rate 08/11/2024 96     Atrial Rate 08/11/2024 97     NV Interval 08/11/2024 196     QRS Duration 08/11/2024 81     QT Interval 08/11/2024 351     QTC Calculation(Bazett)  08/11/2024 444     P Axis 08/11/2024 19     R Axis 08/11/2024 -15     T Parker 08/11/2024 65     QRS Count 08/11/2024 16     Q Onset 08/11/2024 249     T Offset 08/11/2024 425     QTC Fredericia 08/11/2024 410     Protime 08/11/2024 11.2     INR 08/11/2024 1.0     aPTT 08/11/2024 31     Troponin I, High Sensiti* 08/11/2024 251 (HH)     Troponin I, High Sensiti* 08/11/2024 244 (HH)     BNP 08/11/2024 77     Lactate 08/11/2024 1.0     LVOT diam 08/11/2024 2.10     LV Biplane EF 08/11/2024 61     MV E/A ratio 08/11/2024 0.49     Tricuspid annular plane * 08/11/2024 1.3     AV mn grad 08/11/2024 3.0     LA vol index A/L 08/11/2024 9.4     AV pk mukesh 08/11/2024 1.05     LV EF 08/11/2024 61     RV free wall pk S' 08/11/2024 9.79     LV GLS 08/11/2024 6.1     RVSP 08/11/2024 31.9     LVIDd 08/11/2024 4.20     Aortic Valve Area by Con* 08/11/2024 2.77     Aortic Valve Area by Con* 08/11/2024 2.89     AV pk grad 08/11/2024 4.4     LV A4C EF 08/11/2024 58.2     Heparin Unfractionated 08/11/2024 0.2     WBC 08/12/2024 6.3     nRBC 08/12/2024 0.0     RBC 08/12/2024 5.17     Hemoglobin 08/12/2024 16.2     Hematocrit 08/12/2024 47.1     MCV 08/12/2024 91     MCH 08/12/2024 31.3     MCHC 08/12/2024 34.4     RDW 08/12/2024 14.0     Platelets 08/12/2024 199     Glucose 08/12/2024 105 (H)     Sodium 08/12/2024 138     Potassium 08/12/2024 3.8     Chloride 08/12/2024 104     Bicarbonate 08/12/2024 26     Anion Gap 08/12/2024 12     Urea Nitrogen 08/12/2024 19     Creatinine 08/12/2024 1.20     eGFR 08/12/2024 71     Calcium 08/12/2024 8.9     Heparin Unfractionated 08/11/2024 0.3     Heparin Unfractionated 08/12/2024 0.3     Heparin Unfractionated 08/12/2024 0.3     WBC 08/13/2024 5.7     nRBC 08/13/2024 0.0     RBC 08/13/2024 5.09     Hemoglobin 08/13/2024 15.8     Hematocrit 08/13/2024 46.6     MCV 08/13/2024 92     MCH 08/13/2024 31.0     MCHC 08/13/2024 33.9     RDW 08/13/2024 13.8     Platelets 08/13/2024 204     Glucose 08/13/2024  93     Sodium 08/13/2024 138     Potassium 08/13/2024 4.1     Chloride 08/13/2024 102     Bicarbonate 08/13/2024 29     Anion Gap 08/13/2024 11     Urea Nitrogen 08/13/2024 18     Creatinine 08/13/2024 1.18     eGFR 08/13/2024 73     Calcium 08/13/2024 8.8     Magnesium 08/13/2024 2.11     BNP 08/13/2024 17     Heparin Unfractionated 08/13/2024 0.3     Glucose 08/14/2024 85     Sodium 08/14/2024 137     Potassium 08/14/2024 3.7     Chloride 08/14/2024 102     Bicarbonate 08/14/2024 26     Anion Gap 08/14/2024 13     Urea Nitrogen 08/14/2024 20     Creatinine 08/14/2024 1.18     eGFR 08/14/2024 73     Calcium 08/14/2024 9.2     Magnesium 08/14/2024 2.09     WBC 08/14/2024 4.9     nRBC 08/14/2024 0.0     RBC 08/14/2024 5.04     Hemoglobin 08/14/2024 15.9     Hematocrit 08/14/2024 45.0     MCV 08/14/2024 89     MCH 08/14/2024 31.5     MCHC 08/14/2024 35.3     RDW 08/14/2024 13.7     Platelets 08/14/2024 213     Protime 08/14/2024 13.7 (H)     INR 08/14/2024 1.2 (H)      CBC -  Lab Results   Component Value Date    WBC 4.9 08/14/2024    HGB 15.9 08/14/2024    HCT 45.0 08/14/2024    MCV 89 08/14/2024     08/14/2024       CMP -  Lab Results   Component Value Date    CALCIUM 9.2 08/14/2024    PROT 7.8 08/11/2024    ALBUMIN 4.4 08/11/2024    AST 14 08/11/2024    ALT 17 08/11/2024    ALKPHOS 45 08/11/2024    BILITOT 0.7 08/11/2024       LIPID PANEL -   Lab Results   Component Value Date    CHOL 202 (H) 08/28/2023    HDL 35.9 (A) 08/28/2023    CHHDL 5.6 (A) 08/28/2023    VLDL 14 08/28/2023    TRIG 68 08/28/2023       RENAL FUNCTION PANEL -   Lab Results   Component Value Date    K 3.7 08/14/2024       Lab Results   Component Value Date    BNP 17 08/13/2024        Assessment/Plan   Unprovoked PE, on anticoagulation   nonobstructive CAD  Essential hypertension  RV dysfunction in setting of recent PE    Plan:  -I will recommend to continue Eliquis 5 mg twice daily as taking.  Follow-up with hematology soon.    -Continue  losartan for blood pressure is taking.  -Patient is symptomatically better, hemodynamically stable, not hypoxic.  Patient was walked in the hallway and oxygen saturation within normal limit of 97% and no dyspnea.  -If patient has worsening dyspnea or desaturation in near future then will need additional workup.  -Recheck echo in 3 months.      In addition, the following orders were placed today:  Orders Placed This Encounter   Procedures    ECG 12 Lead                 SIGNATURE: Javier Gerardo MD PATIENT NAME: Julio Cesar Casey   DATE/TIME: August 20, 2024 4:18 PM MRN: [id number]

## 2024-08-30 ENCOUNTER — APPOINTMENT (OUTPATIENT)
Dept: PULMONOLOGY | Facility: HOSPITAL | Age: 54
End: 2024-08-30
Payer: COMMERCIAL

## 2024-09-03 ENCOUNTER — HOSPITAL ENCOUNTER (OUTPATIENT)
Age: 54
Discharge: HOME | End: 2024-09-03
Payer: COMMERCIAL

## 2024-09-03 PROCEDURE — 36415 COLL VENOUS BLD VENIPUNCTURE: CPT

## 2024-09-03 PROCEDURE — 85613 RUSSELL VIPER VENOM DILUTED: CPT

## 2024-09-05 ENCOUNTER — OFFICE VISIT (OUTPATIENT)
Dept: PULMONOLOGY | Facility: HOSPITAL | Age: 54
End: 2024-09-05
Payer: COMMERCIAL

## 2024-09-05 VITALS
OXYGEN SATURATION: 98 % | RESPIRATION RATE: 16 BRPM | HEART RATE: 88 BPM | WEIGHT: 206.5 LBS | BODY MASS INDEX: 27.97 KG/M2 | DIASTOLIC BLOOD PRESSURE: 93 MMHG | HEIGHT: 72 IN | SYSTOLIC BLOOD PRESSURE: 158 MMHG

## 2024-09-05 DIAGNOSIS — J30.9 ALLERGIC RHINITIS, UNSPECIFIED SEASONALITY, UNSPECIFIED TRIGGER: ICD-10-CM

## 2024-09-05 DIAGNOSIS — I26.99 PULMONARY EMBOLISM, UNSPECIFIED CHRONICITY, UNSPECIFIED PULMONARY EMBOLISM TYPE, UNSPECIFIED WHETHER ACUTE COR PULMONALE PRESENT (MULTI): Primary | ICD-10-CM

## 2024-09-05 DIAGNOSIS — J45.909 EXTRINSIC ASTHMA, UNSPECIFIED ASTHMA SEVERITY, UNSPECIFIED WHETHER COMPLICATED, UNSPECIFIED WHETHER PERSISTENT (HHS-HCC): ICD-10-CM

## 2024-09-05 PROCEDURE — 99214 OFFICE O/P EST MOD 30 MIN: CPT | Performed by: NURSE PRACTITIONER

## 2024-09-05 PROCEDURE — 99204 OFFICE O/P NEW MOD 45 MIN: CPT | Performed by: NURSE PRACTITIONER

## 2024-09-05 PROCEDURE — 3080F DIAST BP >= 90 MM HG: CPT | Performed by: NURSE PRACTITIONER

## 2024-09-05 PROCEDURE — 3008F BODY MASS INDEX DOCD: CPT | Performed by: NURSE PRACTITIONER

## 2024-09-05 PROCEDURE — 1036F TOBACCO NON-USER: CPT | Performed by: NURSE PRACTITIONER

## 2024-09-05 PROCEDURE — 3077F SYST BP >= 140 MM HG: CPT | Performed by: NURSE PRACTITIONER

## 2024-09-05 ASSESSMENT — ENCOUNTER SYMPTOMS
FEVER: 0
COUGH: 0
FATIGUE: 0
SHORTNESS OF BREATH: 0
CHILLS: 0
UNEXPECTED WEIGHT CHANGE: 0
RHINORRHEA: 0
WHEEZING: 0

## 2024-09-05 NOTE — PATIENT INSTRUCTIONS
Continue Eliquis twice a day, everyday.  Call with any questions or concerns.  Follow up with me if needed.

## 2024-09-05 NOTE — PROGRESS NOTES
Subjective   Patient ID: Julio Cesar Casey is a 54 y.o. male who presents for NPV for PE.     HPI: Patient has PMH of HTN, CAD, and was recently hospitalized for acute unprovoked PE. He has been feeling improved since discharge. He denies any shortness of breath or chest pain. He states that what prompted him to go to the hospital was chest heaviness when lying down. He had also an episode of rapid breathing. He was discharged home on Eliquis. He states that he has several environmental allergies, he is highly allergic to cats. He has a history of allergic asthma and uses albuterol prn for this. He saw the hematologist at [facility] already. His uncle has a history of blood clots but this was provoked. His grandmother  of a blood clot also he states he found out. He has never smoked and denies exposure to second hand smoke. He denies prior occupational exposures.    Review of Systems   Constitutional:  Negative for chills, fatigue, fever and unexpected weight change.   HENT:  Negative for congestion, postnasal drip and rhinorrhea.    Respiratory:  Negative for cough (denies hemoptysis.), shortness of breath and wheezing.    Cardiovascular:  Negative for chest pain and leg swelling.   All other systems reviewed and are negative.      Objective   Physical Exam  Vitals reviewed.   Constitutional:       Appearance: Normal appearance.   HENT:      Head: Normocephalic.   Cardiovascular:      Rate and Rhythm: Normal rate and regular rhythm.   Pulmonary:      Effort: Pulmonary effort is normal.      Breath sounds: Normal breath sounds.   Skin:     General: Skin is warm and dry.   Neurological:      Mental Status: He is alert.         Assessment/Plan   Acute unprovoked PE  Allergic asthma    Plan:    Patient was referred for hospital follow up for acute unprovoked PE. CT chest done on  with extensive pulmonary emboli with right ventricular heart strain. He was started on heparin and was discharged home on Eliquis. He is  feeling improvement since discharge. His PE was considered to be unprovoked and he already saw hematologist who ordered thrombophilia workup. He is following up with him in a few weeks. He has a family history of blood clots. He also saw cardiologist and is following up with a repeat ECHO. He will continue on Eliquis 5 mg BID and will defer further management of Eliquis to hematologist.     He does have a history of allergic asthma and has albuterol that he uses prn. He is worse around cats but has multiple other environmental allergies and used to follow with allergist in the past.     Overall we discussed that since PE was unprovoked will defer further management to hematology. I discussed we can repeat a CT chest in 3-6 months if patient has any symptoms/concerns. I will bring him back on an as needed basis. I instructed patient to call with questions or concerns. Patient and wife agreeable to plan of care.     Total time:  45 min.

## 2024-09-07 LAB — DILUTE RUSSELL VIPER VENOM TIME: NEGATIVE

## 2024-10-11 LAB — D-DIMER QUANTITATIVE: <200 NG/ML DDU (ref 0–230)

## 2024-11-21 ENCOUNTER — APPOINTMENT (OUTPATIENT)
Dept: CARDIOLOGY | Facility: HOSPITAL | Age: 54
End: 2024-11-21
Payer: COMMERCIAL

## 2024-11-21 VITALS
WEIGHT: 201 LBS | SYSTOLIC BLOOD PRESSURE: 130 MMHG | DIASTOLIC BLOOD PRESSURE: 84 MMHG | HEIGHT: 72 IN | BODY MASS INDEX: 27.22 KG/M2 | HEART RATE: 98 BPM | OXYGEN SATURATION: 96 %

## 2024-11-21 DIAGNOSIS — I27.82 CHRONIC SADDLE PULMONARY EMBOLISM WITH ACUTE COR PULMONALE (MULTI): Primary | ICD-10-CM

## 2024-11-21 DIAGNOSIS — I26.02 CHRONIC SADDLE PULMONARY EMBOLISM WITH ACUTE COR PULMONALE (MULTI): Primary | ICD-10-CM

## 2024-11-21 DIAGNOSIS — I10 ESSENTIAL (PRIMARY) HYPERTENSION: ICD-10-CM

## 2024-11-21 DIAGNOSIS — I25.10 ASHD (ARTERIOSCLEROTIC HEART DISEASE): ICD-10-CM

## 2024-11-21 DIAGNOSIS — I51.7 ENLARGED RV (RIGHT VENTRICLE): ICD-10-CM

## 2024-11-21 PROCEDURE — 3008F BODY MASS INDEX DOCD: CPT | Performed by: STUDENT IN AN ORGANIZED HEALTH CARE EDUCATION/TRAINING PROGRAM

## 2024-11-21 PROCEDURE — 3075F SYST BP GE 130 - 139MM HG: CPT | Performed by: STUDENT IN AN ORGANIZED HEALTH CARE EDUCATION/TRAINING PROGRAM

## 2024-11-21 PROCEDURE — 3079F DIAST BP 80-89 MM HG: CPT | Performed by: STUDENT IN AN ORGANIZED HEALTH CARE EDUCATION/TRAINING PROGRAM

## 2024-11-21 PROCEDURE — 99215 OFFICE O/P EST HI 40 MIN: CPT | Performed by: STUDENT IN AN ORGANIZED HEALTH CARE EDUCATION/TRAINING PROGRAM

## 2024-11-21 PROCEDURE — 1036F TOBACCO NON-USER: CPT | Performed by: STUDENT IN AN ORGANIZED HEALTH CARE EDUCATION/TRAINING PROGRAM

## 2024-11-21 ASSESSMENT — PAIN SCALES - GENERAL: PAINLEVEL_OUTOF10: 0-NO PAIN

## 2024-11-21 ASSESSMENT — PATIENT HEALTH QUESTIONNAIRE - PHQ9
1. LITTLE INTEREST OR PLEASURE IN DOING THINGS: NOT AT ALL
2. FEELING DOWN, DEPRESSED OR HOPELESS: NOT AT ALL
SUM OF ALL RESPONSES TO PHQ9 QUESTIONS 1 AND 2: 0

## 2024-11-21 ASSESSMENT — COLUMBIA-SUICIDE SEVERITY RATING SCALE - C-SSRS
1. IN THE PAST MONTH, HAVE YOU WISHED YOU WERE DEAD OR WISHED YOU COULD GO TO SLEEP AND NOT WAKE UP?: NO
6. HAVE YOU EVER DONE ANYTHING, STARTED TO DO ANYTHING, OR PREPARED TO DO ANYTHING TO END YOUR LIFE?: NO
2. HAVE YOU ACTUALLY HAD ANY THOUGHTS OF KILLING YOURSELF?: NO

## 2024-11-21 NOTE — PROGRESS NOTES
[facility] Clinic Note    Date: 11/21/24  Time: 12:58 PM    Subjective   Julio Cesar Casey is a 54 y.o. male with PMHx s/f HTN, nonobstructive CAD, unprovoked PE on AC, who is coming to clinic for follow-up visit.  Patient presented to the hospital due to on 8/12/24 with Chest discomfort/shortness of breath. The patient presented to emergency department with complaint of heaviness in the chest shortness of breath some chest discomfort. CTA of the chest showed bilateral PE with evidence for RV strain. The ED provider contacted the PERT team. Given that patient was normotensive heart rate was around 100 was advised to check additional lab studies which have been relatively unremarkable. Patient was felt not to need emergent intervention from the PERT team. Echo: EF 61%, severely reduced right ventricular systolic function, RV free wall akinesis with sparing of the apex, RV free wall strain significantly reduced at -3.4%, mildly enlarged right ventricle. US BLE without DVT.  Patient continued to improve in symptoms throughout the stay and denied any dyspnea.  Patient was also walked in the hallway and his oxygen saturation remained within normal limits and he did not develop any worsening dyspnea.  His PESI  score was also on lower side.  Given all of these patient was continued on anticoagulation.  Patient is following with hematology and reportedly few genes were abnormal- unknown details and he will need continued lifelong AC.  No clear provoking factors for his PE.     I offered BB to patient but patient declined. He had tried coreg in past but didn't like it. He will not take statin.    He reports he is felling much better without dyspnea, chest pain, or syncope.        Review of Systems:  Otherwise, limited cardiovascular review of systems is negative.        Medical History:   He has no past medical history on file.  Surgical History:   History reviewed. No pertinent surgical  "history.PSHP@  Social History:   Social Drivers of Health with Concerns     Alcohol Use: Alcohol Misuse (8/11/2024)    AUDIT-C     Frequency of Alcohol Consumption: 4 or more times a week     Average Number of Drinks: 1 or 2     Frequency of Binge Drinking: Less than monthly   Food Insecurity: Not on file   Physical Activity: Not on file   Stress: Not on file   Social Connections: Not on file   Intimate Partner Violence: Not on file   Utilities: Not on file   Digital Equity: Not on file   Health Literacy: Not on file     Family History:   Family History   Problem Relation Name Age of Onset    Cancer Mother Do paz     Hypertension Mother Do paz     Cancer Father Alexi paz     Hypertension Father Alexi paz       Allergies:  Penicillins    Outpatient Medications:  Current Outpatient Medications   Medication Instructions    albuterol 90 mcg/actuation inhaler 2 puffs, Every 4 hours PRN    apixaban (Eliquis) 5 mg (74 tabs) tablet Take 2 tablets (10 mg) by mouth 2 times a day for 6 days, THEN 1 tablet (5 mg) 2 times a day for 25 days.    apixaban (ELIQUIS) 5 mg, oral, 2 times daily    losartan (COZAAR) 50 mg, Daily       Objective     Physical Exam  Vitals:    11/21/24 1031   BP: 130/84   BP Location: Left arm   Patient Position: Sitting   BP Cuff Size: Adult   Pulse: 98   SpO2: 96%   Weight: 91.2 kg (201 lb)   Height: 1.835 m (6' 0.25\")     Wt Readings from Last 3 Encounters:   11/21/24 91.2 kg (201 lb)   09/05/24 93.7 kg (206 lb 8 oz)   08/20/24 93.9 kg (207 lb)            General: Alert and Oriented, No distress, cooperative  Head: Normocephalic without obvious abnormality, atraumatic  Eyes: Conjunctiva/corneas clear, EOM's grossly intact  Neck: Supple, trachea midline, No thyroid enlargement/tenderness/nodules; No JVD  Lungs: Clear to auscultation bilaterally, no wheezes, rhonci, or rales. respirations unlabored  Chest Wall: No tenderness or deformity  Heart: Regular rhythm, normal S1/S2, no murmur  Abdomen: " Soft, non-tender, Non-distended, bowel sounds active  Extremities: No edema, no cyanosis, no clubbing  Skin: Skin color, texture, turgor normal.  No rashes or lesions noted  Neurologic: Alert and oriented x 3, grossly moving all extremities, speech intact              I have personally reviewed the following images and laboratory findings:  ECG: NSR, possible septal infarct  Echocardiogram:     PHYSICIAN INTERPRETATION:  Left Ventricle: The left ventricular systolic function is normal, with a Drake's biplane calculated ejection fraction of 61%. There are no regional wall motion abnormalities. The left ventricular cavity size is normal. Left Ventricular Global Longitudinal Strain - 6.1 %. Spectral Doppler shows a normal pattern of left ventricular diastolic filling.  Left Atrium: The left atrium is normal in size.  Right Ventricle: The right ventricle is mildly enlarged. There is severely reduced right ventricular systolic function. RV free wall akinesis with sparing of the apex. RV free wall strain significantly reduced at -3.4%.  Right Atrium: The right atrium is normal in size.  Aortic Valve: The aortic valve is trileaflet. The aortic valve dimensionless index is 0.80. There is no evidence of aortic valve regurgitation. The peak instantaneous gradient of the aortic valve is 4.4 mmHg. The mean gradient of the aortic valve is 3.0 mmHg.  Mitral Valve: The mitral valve is normal in structure. There is no evidence of mitral valve regurgitation.  Tricuspid Valve: The tricuspid valve is structurally normal. There is trace tricuspid regurgitation.  Pulmonic Valve: The pulmonic valve is structurally normal. There is physiologic pulmonic valve regurgitation.  Pericardium: There is no pericardial effusion noted.  Aorta: The aortic root is normal. There is mild dilatation of the ascending aorta.        CONCLUSIONS:   1. The left ventricular systolic function is normal, with a Drake's biplane calculated ejection fraction  of 61%.   2. There is severely reduced right ventricular systolic function.   3. RV free wall akinesis with sparing of the apex. RV free wall strain significantly reduced at -3.4%.   4. Mildly enlarged right ventricle.     Laboratory values:   No visits with results within 2 Month(s) from this visit.   Latest known visit with results is:   Admission on 08/11/2024, Discharged on 08/14/2024   Component Date Value    WBC 08/11/2024 8.9     nRBC 08/11/2024 0.0     RBC 08/11/2024 5.62     Hemoglobin 08/11/2024 17.8 (H)     Hematocrit 08/11/2024 51.3     MCV 08/11/2024 91     MCH 08/11/2024 31.7     MCHC 08/11/2024 34.7     RDW 08/11/2024 14.4     Platelets 08/11/2024 208     Neutrophils % 08/11/2024 72.9     Immature Granulocytes %,* 08/11/2024 0.4     Lymphocytes % 08/11/2024 18.2     Monocytes % 08/11/2024 7.5     Eosinophils % 08/11/2024 0.8     Basophils % 08/11/2024 0.2     Neutrophils Absolute 08/11/2024 6.47     Immature Granulocytes Ab* 08/11/2024 0.04     Lymphocytes Absolute 08/11/2024 1.62     Monocytes Absolute 08/11/2024 0.67     Eosinophils Absolute 08/11/2024 0.07     Basophils Absolute 08/11/2024 0.02     Glucose 08/11/2024 112 (H)     Sodium 08/11/2024 136     Potassium 08/11/2024 4.1     Chloride 08/11/2024 102     Bicarbonate 08/11/2024 27     Anion Gap 08/11/2024 11     Urea Nitrogen 08/11/2024 15     Creatinine 08/11/2024 1.13     eGFR 08/11/2024 77     Calcium 08/11/2024 9.7     Albumin 08/11/2024 4.4     Alkaline Phosphatase 08/11/2024 45     Total Protein 08/11/2024 7.8     AST 08/11/2024 14     Bilirubin, Total 08/11/2024 0.7     ALT 08/11/2024 17     Magnesium 08/11/2024 2.06     Ventricular Rate 08/11/2024 91     Atrial Rate 08/11/2024 91     VA Interval 08/11/2024 199     QRS Duration 08/11/2024 76     QT Interval 08/11/2024 353     QTC Calculation(Bazett) 08/11/2024 435     P Axis 08/11/2024 18     R Axis 08/11/2024 -15     T Axis 08/11/2024 46     QRS Count 08/11/2024 15     Q Onset  08/11/2024 251     T Offset 08/11/2024 427     QTC Fredericia 08/11/2024 405     Ventricular Rate 08/11/2024 96     Atrial Rate 08/11/2024 97     [place] Interval 08/11/2024 196     QRS Duration 08/11/2024 81     QT Interval 08/11/2024 351     QTC Calculation(Bazett) 08/11/2024 444     P Axis 08/11/2024 19     R Axis 08/11/2024 -15     T Knox 08/11/2024 65     QRS Count 08/11/2024 16     Q Onset 08/11/2024 249     T Offset 08/11/2024 425     QTC Fredericia 08/11/2024 410     Protime 08/11/2024 11.2     INR 08/11/2024 1.0     aPTT 08/11/2024 31     Troponin I, High Sensiti* 08/11/2024 251 ()     Troponin I, High Sensiti* 08/11/2024 244 ()     BNP 08/11/2024 77     Lactate 08/11/2024 1.0     LVOT diam 08/11/2024 2.10     LV Biplane EF 08/11/2024 61     MV E/A ratio 08/11/2024 0.49     Tricuspid annular plane * 08/11/2024 1.3     AV mn grad 08/11/2024 3.0     LA vol index A/L 08/11/2024 9.4     AV pk mukesh 08/11/2024 1.05     LV EF 08/11/2024 61     RV free wall pk S' 08/11/2024 9.79     LV GLS 08/11/2024 6.1     RVSP 08/11/2024 31.9     LVIDd 08/11/2024 4.20     Aortic Valve Area by Con* 08/11/2024 2.77     Aortic Valve Area by Con* 08/11/2024 2.89     AV pk grad 08/11/2024 4.4     LV A4C EF 08/11/2024 58.2     Heparin Unfractionated 08/11/2024 0.2     WBC 08/12/2024 6.3     nRBC 08/12/2024 0.0     RBC 08/12/2024 5.17     Hemoglobin 08/12/2024 16.2     Hematocrit 08/12/2024 47.1     MCV 08/12/2024 91     MCH 08/12/2024 31.3     MCHC 08/12/2024 34.4     RDW 08/12/2024 14.0     Platelets 08/12/2024 199     Glucose 08/12/2024 105 (H)     Sodium 08/12/2024 138     Potassium 08/12/2024 3.8     Chloride 08/12/2024 104     Bicarbonate 08/12/2024 26     Anion Gap 08/12/2024 12     Urea Nitrogen 08/12/2024 19     Creatinine 08/12/2024 1.20     eGFR 08/12/2024 71     Calcium 08/12/2024 8.9     Heparin Unfractionated 08/11/2024 0.3     Heparin Unfractionated 08/12/2024 0.3     Heparin Unfractionated 08/12/2024 0.3     WBC  08/13/2024 5.7     nRBC 08/13/2024 0.0     RBC 08/13/2024 5.09     Hemoglobin 08/13/2024 15.8     Hematocrit 08/13/2024 46.6     MCV 08/13/2024 92     MCH 08/13/2024 31.0     MCHC 08/13/2024 33.9     RDW 08/13/2024 13.8     Platelets 08/13/2024 204     Glucose 08/13/2024 93     Sodium 08/13/2024 138     Potassium 08/13/2024 4.1     Chloride 08/13/2024 102     Bicarbonate 08/13/2024 29     Anion Gap 08/13/2024 11     Urea Nitrogen 08/13/2024 18     Creatinine 08/13/2024 1.18     eGFR 08/13/2024 73     Calcium 08/13/2024 8.8     Magnesium 08/13/2024 2.11     BNP 08/13/2024 17     Heparin Unfractionated 08/13/2024 0.3     Glucose 08/14/2024 85     Sodium 08/14/2024 137     Potassium 08/14/2024 3.7     Chloride 08/14/2024 102     Bicarbonate 08/14/2024 26     Anion Gap 08/14/2024 13     Urea Nitrogen 08/14/2024 20     Creatinine 08/14/2024 1.18     eGFR 08/14/2024 73     Calcium 08/14/2024 9.2     Magnesium 08/14/2024 2.09     WBC 08/14/2024 4.9     nRBC 08/14/2024 0.0     RBC 08/14/2024 5.04     Hemoglobin 08/14/2024 15.9     Hematocrit 08/14/2024 45.0     MCV 08/14/2024 89     MCH 08/14/2024 31.5     MCHC 08/14/2024 35.3     RDW 08/14/2024 13.7     Platelets 08/14/2024 213     Protime 08/14/2024 13.7 (H)     INR 08/14/2024 1.2 (H)      CBC -  Lab Results   Component Value Date    WBC 4.9 08/14/2024    HGB 15.9 08/14/2024    HCT 45.0 08/14/2024    MCV 89 08/14/2024     08/14/2024       CMP -  Lab Results   Component Value Date    CALCIUM 9.2 08/14/2024    PROT 7.8 08/11/2024    ALBUMIN 4.4 08/11/2024    AST 14 08/11/2024    ALT 17 08/11/2024    ALKPHOS 45 08/11/2024    BILITOT 0.7 08/11/2024       LIPID PANEL -   Lab Results   Component Value Date    CHOL 202 (H) 08/28/2023    HDL 35.9 (A) 08/28/2023    CHHDL 5.6 (A) 08/28/2023    VLDL 14 08/28/2023    TRIG 68 08/28/2023       RENAL FUNCTION PANEL -   Lab Results   Component Value Date    K 3.7 08/14/2024       Lab Results   Component Value Date    BNP 17  08/13/2024        Assessment/Plan   Unprovoked PE, on anticoagulation   nonobstructive CAD  Essential hypertension  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  Continue Follow-up with hematology.    -Continue losartan for blood pressure is taking.  -Will repeat TTE to evaluate for RV fx.   -If patient has worsening dyspnea RV dysfunction then consider additional workup.   - Have offered statin and BB but patient declined.        In addition, the following orders were placed today:  Orders Placed This Encounter   Procedures    Transthoracic echo (TTE) limited                 SIGNATURE: Javier Gerardo MD PATIENT NAME: Julio Cesar Casey   DATE/TIME: November 21, 2024 12:58 PM MRN: [id number]

## 2024-12-13 ENCOUNTER — HOSPITAL ENCOUNTER (OUTPATIENT)
Dept: CARDIOLOGY | Facility: HOSPITAL | Age: 54
Discharge: HOME | End: 2024-12-13
Payer: COMMERCIAL

## 2024-12-13 DIAGNOSIS — I26.02 CHRONIC SADDLE PULMONARY EMBOLISM WITH ACUTE COR PULMONALE (MULTI): ICD-10-CM

## 2024-12-13 DIAGNOSIS — I27.82 CHRONIC SADDLE PULMONARY EMBOLISM WITH ACUTE COR PULMONALE (MULTI): ICD-10-CM

## 2024-12-13 DIAGNOSIS — I51.7 ENLARGED RV (RIGHT VENTRICLE): ICD-10-CM

## 2024-12-13 PROCEDURE — 93308 TTE F-UP OR LMTD: CPT

## 2024-12-13 PROCEDURE — 93308 TTE F-UP OR LMTD: CPT | Performed by: STUDENT IN AN ORGANIZED HEALTH CARE EDUCATION/TRAINING PROGRAM

## 2024-12-13 PROCEDURE — 93356 MYOCRD STRAIN IMG SPCKL TRCK: CPT | Performed by: STUDENT IN AN ORGANIZED HEALTH CARE EDUCATION/TRAINING PROGRAM

## 2024-12-14 LAB
EJECTION FRACTION: 58 %
LEFT VENTRICLE INTERNAL DIMENSION DIASTOLE: 4.19 CM (ref 3.5–6)
RIGHT VENTRICLE FREE WALL PEAK S': 12.22 CM/S
RIGHT VENTRICLE PEAK SYSTOLIC PRESSURE: 17.1 MMHG
TRICUSPID ANNULAR PLANE SYSTOLIC EXCURSION: 1.5 CM

## 2024-12-16 ENCOUNTER — TELEPHONE (OUTPATIENT)
Dept: CARDIOLOGY | Facility: HOSPITAL | Age: 54
End: 2024-12-16
Payer: COMMERCIAL

## 2024-12-16 NOTE — TELEPHONE ENCOUNTER
----- Message from Javier Gerardo sent at 12/15/2024  1:05 PM EST -----  routine  ----- Message -----  From: Boy Syngo - Cardiology Results In  Sent: 12/14/2024  11:59 AM EST  To: Javier Gerardo MD

## 2024-12-16 NOTE — TELEPHONE ENCOUNTER
RN called patient, reviewed echocardiogram results, patient has no questions at this time, and is agreeable to follow up in May.
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
## 2025-05-22 ENCOUNTER — APPOINTMENT (OUTPATIENT)
Dept: CARDIOLOGY | Facility: HOSPITAL | Age: 55
End: 2025-05-22
Payer: COMMERCIAL

## 2025-06-02 PROCEDURE — 99284 EMERGENCY DEPT VISIT MOD MDM: CPT

## 2025-06-03 ENCOUNTER — HOSPITAL ENCOUNTER (EMERGENCY)
Age: 55
Discharge: HOME | End: 2025-06-03
Attending: STUDENT IN AN ORGANIZED HEALTH CARE EDUCATION/TRAINING PROGRAM
Payer: COMMERCIAL

## 2025-06-03 VITALS
HEIGHT: 72 IN | WEIGHT: 200 LBS | HEART RATE: 82 BPM | BODY MASS INDEX: 27.09 KG/M2 | RESPIRATION RATE: 16 BRPM | TEMPERATURE: 98.5 F | OXYGEN SATURATION: 98 % | SYSTOLIC BLOOD PRESSURE: 156 MMHG | DIASTOLIC BLOOD PRESSURE: 92 MMHG

## 2025-06-03 DIAGNOSIS — E86.0 DEHYDRATION: ICD-10-CM

## 2025-06-03 DIAGNOSIS — R00.2 PALPITATIONS: Primary | ICD-10-CM

## 2025-06-03 LAB
ALBUMIN SERPL-MCNC: 4.3 G/DL (ref 3.5–5.2)
ALP SERPL-CCNC: 50 U/L (ref 40–129)
ALT SERPL-CCNC: 15 U/L (ref 0–40)
ANION GAP SERPL CALCULATED.3IONS-SCNC: 12 MMOL/L (ref 7–16)
AST SERPL-CCNC: 13 U/L (ref 0–39)
BASOPHILS # BLD: 0.03 K/UL (ref 0–0.2)
BASOPHILS NFR BLD: 0 % (ref 0–2)
BILIRUB SERPL-MCNC: 0.4 MG/DL (ref 0–1.2)
BILIRUB UR QL STRIP: NEGATIVE
BUN SERPL-MCNC: 24 MG/DL (ref 6–20)
CALCIUM SERPL-MCNC: 9.8 MG/DL (ref 8.6–10.2)
CHLORIDE SERPL-SCNC: 101 MMOL/L (ref 98–107)
CLARITY UR: CLEAR
CO2 SERPL-SCNC: 26 MMOL/L (ref 22–29)
COLOR UR: YELLOW
CREAT SERPL-MCNC: 1.2 MG/DL (ref 0.7–1.2)
EKG ATRIAL RATE: 102 BPM
EKG P AXIS: 51 DEGREES
EKG P-R INTERVAL: 270 MS
EKG Q-T INTERVAL: 308 MS
EKG QRS DURATION: 74 MS
EKG QTC CALCULATION (BAZETT): 401 MS
EKG R AXIS: -6 DEGREES
EKG T AXIS: 70 DEGREES
EKG VENTRICULAR RATE: 102 BPM
EOSINOPHIL # BLD: 0.08 K/UL (ref 0.05–0.5)
EOSINOPHILS RELATIVE PERCENT: 1 % (ref 0–6)
ERYTHROCYTE [DISTWIDTH] IN BLOOD BY AUTOMATED COUNT: 13.3 % (ref 11.5–15)
GFR, ESTIMATED: 70 ML/MIN/1.73M2
GLUCOSE SERPL-MCNC: 111 MG/DL (ref 74–99)
GLUCOSE UR STRIP-MCNC: NEGATIVE MG/DL
HCT VFR BLD AUTO: 47.2 % (ref 37–54)
HGB BLD-MCNC: 16.4 G/DL (ref 12.5–16.5)
HGB UR QL STRIP.AUTO: NEGATIVE
IMM GRANULOCYTES # BLD AUTO: 0.05 K/UL (ref 0–0.58)
IMM GRANULOCYTES NFR BLD: 1 % (ref 0–5)
KETONES UR STRIP-MCNC: ABNORMAL MG/DL
LEUKOCYTE ESTERASE UR QL STRIP: NEGATIVE
LYMPHOCYTES NFR BLD: 1.14 K/UL (ref 1.5–4)
LYMPHOCYTES RELATIVE PERCENT: 13 % (ref 20–42)
MAGNESIUM SERPL-MCNC: 2.1 MG/DL (ref 1.6–2.6)
MCH RBC QN AUTO: 30.7 PG (ref 26–35)
MCHC RBC AUTO-ENTMCNC: 34.7 G/DL (ref 32–34.5)
MCV RBC AUTO: 88.2 FL (ref 80–99.9)
MONOCYTES NFR BLD: 0.89 K/UL (ref 0.1–0.95)
MONOCYTES NFR BLD: 10 % (ref 2–12)
NEUTROPHILS NFR BLD: 74 % (ref 43–80)
NEUTS SEG NFR BLD: 6.38 K/UL (ref 1.8–7.3)
NITRITE UR QL STRIP: NEGATIVE
PH UR STRIP: 6 [PH] (ref 5–8)
PLATELET # BLD AUTO: 231 K/UL (ref 130–450)
PMV BLD AUTO: 8.2 FL (ref 7–12)
POTASSIUM SERPL-SCNC: 3.7 MMOL/L (ref 3.5–5)
PROT SERPL-MCNC: 7.4 G/DL (ref 6.4–8.3)
PROT UR STRIP-MCNC: NEGATIVE MG/DL
RBC # BLD AUTO: 5.35 M/UL (ref 3.8–5.8)
RBC #/AREA URNS HPF: ABNORMAL /HPF
SODIUM SERPL-SCNC: 139 MMOL/L (ref 132–146)
SP GR UR STRIP: 1.01 (ref 1–1.03)
TROPONIN I SERPL HS-MCNC: 6 NG/L (ref 0–22)
UROBILINOGEN UR STRIP-ACNC: 0.2 EU/DL (ref 0–1)
WBC #/AREA URNS HPF: ABNORMAL /HPF
WBC OTHER # BLD: 8.6 K/UL (ref 4.5–11.5)

## 2025-06-03 PROCEDURE — 81001 URINALYSIS AUTO W/SCOPE: CPT

## 2025-06-03 PROCEDURE — 93005 ELECTROCARDIOGRAM TRACING: CPT | Performed by: STUDENT IN AN ORGANIZED HEALTH CARE EDUCATION/TRAINING PROGRAM

## 2025-06-03 PROCEDURE — 83735 ASSAY OF MAGNESIUM: CPT

## 2025-06-03 PROCEDURE — 2580000003 HC RX 258: Performed by: STUDENT IN AN ORGANIZED HEALTH CARE EDUCATION/TRAINING PROGRAM

## 2025-06-03 PROCEDURE — 96360 HYDRATION IV INFUSION INIT: CPT

## 2025-06-03 PROCEDURE — 96361 HYDRATE IV INFUSION ADD-ON: CPT

## 2025-06-03 PROCEDURE — 93010 ELECTROCARDIOGRAM REPORT: CPT | Performed by: INTERNAL MEDICINE

## 2025-06-03 PROCEDURE — 84484 ASSAY OF TROPONIN QUANT: CPT

## 2025-06-03 PROCEDURE — 85025 COMPLETE CBC W/AUTO DIFF WBC: CPT

## 2025-06-03 PROCEDURE — 80053 COMPREHEN METABOLIC PANEL: CPT

## 2025-06-03 RX ORDER — LOSARTAN POTASSIUM 50 MG/1
50 TABLET ORAL DAILY
COMMUNITY

## 2025-06-03 RX ORDER — 0.9 % SODIUM CHLORIDE 0.9 %
1000 INTRAVENOUS SOLUTION INTRAVENOUS ONCE
Status: COMPLETED | OUTPATIENT
Start: 2025-06-03 | End: 2025-06-03

## 2025-06-03 RX ADMIN — SODIUM CHLORIDE 1000 ML: 0.9 INJECTION, SOLUTION INTRAVENOUS at 02:18

## 2025-06-03 ASSESSMENT — PAIN DESCRIPTION - PAIN TYPE: TYPE: ACUTE PAIN

## 2025-06-03 ASSESSMENT — ENCOUNTER SYMPTOMS
SINUS PRESSURE: 0
VOMITING: 0
WHEEZING: 0
BACK PAIN: 0
EYE DISCHARGE: 0
SHORTNESS OF BREATH: 0
DIARRHEA: 0
ABDOMINAL PAIN: 0
EYE REDNESS: 0
SINUS PAIN: 1
COUGH: 1
SORE THROAT: 0
NAUSEA: 0
EYE PAIN: 0

## 2025-06-03 ASSESSMENT — PAIN DESCRIPTION - FREQUENCY: FREQUENCY: CONTINUOUS

## 2025-06-03 ASSESSMENT — PAIN - FUNCTIONAL ASSESSMENT
PAIN_FUNCTIONAL_ASSESSMENT: 0-10
PAIN_FUNCTIONAL_ASSESSMENT: 0-10

## 2025-06-03 ASSESSMENT — LIFESTYLE VARIABLES
HOW MANY STANDARD DRINKS CONTAINING ALCOHOL DO YOU HAVE ON A TYPICAL DAY: PATIENT DOES NOT DRINK
HOW OFTEN DO YOU HAVE A DRINK CONTAINING ALCOHOL: NEVER

## 2025-06-03 ASSESSMENT — PAIN SCALES - GENERAL
PAINLEVEL_OUTOF10: 6
PAINLEVEL_OUTOF10: 3

## 2025-06-03 ASSESSMENT — PAIN DESCRIPTION - DESCRIPTORS
DESCRIPTORS: ACHING
DESCRIPTORS: ACHING

## 2025-06-03 ASSESSMENT — PAIN DESCRIPTION - LOCATION
LOCATION: HEAD
LOCATION: HEAD

## 2025-06-03 ASSESSMENT — PAIN DESCRIPTION - ONSET: ONSET: ON-GOING

## 2025-06-03 NOTE — DISCHARGE INSTRUCTIONS
Follow-up with your family physician.  If symptoms worsen or concern arises please return for reevaluation.  Please stay hydrated

## 2025-06-03 NOTE — ED PROVIDER NOTES
Department of Emergency Medicine   ED  Provider Note  Admit Date/RoomTime: 6/3/2025 12:58 AM  ED Room: 14/14              [facility]     Wilver Odell is a 54 y.o. male with a PMHx significant for  pulmonary embolism on anticoagulation, hypertension on medication  who presents for evaluation of palpitations, elevated heart rate, beginning prior to arrival. The complaint has been persistent, moderate in severity, and worsened by nothing.   The patient states that he has been dealing with cough and congestion and runny nose for the last week or so.  [facility], was started on doxycycline.  Notes that he took the second dose of doxycycline which he feels was little too close to the first 1 he also took Flonase.  He is on Allegra as well as taking some Mucinex DM.  States that shortly thereafter he started to feel an elevated heart rate as well as palpitations.  Denies any chest discomfort or shortness of breath.  States that when he had his pulmonary embolism last year he had the elevated heart rate and palpitations as well which is his concern.  Notes he has not missed any doses of his anticoagulation.       Review of Systems   Constitutional:  Positive for fatigue. Negative for chills and fever.   HENT:  Positive for congestion and sinus pain. Negative for ear pain, sinus pressure and sore throat.    Eyes:  Negative for pain, discharge and redness.   Respiratory:  Positive for cough. Negative for shortness of breath and wheezing.    Cardiovascular:  Positive for palpitations. Negative for chest pain.   Gastrointestinal:  Negative for abdominal pain, diarrhea, nausea and vomiting.   Genitourinary:  Negative for dysuria and frequency.   Musculoskeletal:  Negative for arthralgias and back pain.   Skin:  Negative for rash and wound.   Neurological:  Negative for weakness and headaches.   Hematological:  Negative for adenopathy.   All other systems reviewed and are negative.       Physical  NSTEMI  Historians that case was discussed with: None   My EKG interpretation: See ED course  Imaging Non-plain film images such as CT, Ultrasound and MRI are read by the radiologist. Plain radiographic images are visualized and preliminarily interpreted by the ED provider: none  Discussed with other providers: none  Tests Considered but not ordered: None  Decision making tools/risks stratification / MDM / Independent Interpretation of labs: Wilver Odell presents to the ED for evaluation of palpitations.  History frompatient / EMR , with no limitations. Workup in the ED revealed patient currently appears to be no acute distress.  Notes history of pulmonary embolism in the past, states the only symptom he had at that time was elevated heart rate.  States that he has been feeling ill had taken some medication today and started developing palpitations which concerned him.  He has been taking his anticoagulation, has not missed any doses.  Currently appears to be in no acute distress.  His heart rate has improved as he has been in the waiting room.  CBC demonstrates no leukocytosis, hemoglobin within normal limits at 16.4, metabolic panel normal electrolytes, glucose of 111, BUN of 24 with creatinine 1.2.  Discussed with him D-looped of some dehydration with that BUN.  Troponin is 6, magnesium also within normal limits at 2.1.  Patient's heart rate did improve significantly, no acute findings on EKG.  While in department patient given 1 L normal saline IV fluid bolus.  He was given return precautions, recommended changing different medication being careful as to what medications he takes.  To follow-up with his family physician, given return precautions.  Social Determinants of health impacting treatment or disposition: none  CODE status and Discussions: none    Patient continues to be non-toxic on re-evaluation. Findings were discussed with the patient and reasons to immediately return to the ED were articulated to them.

## 2025-08-01 ENCOUNTER — OFFICE VISIT (OUTPATIENT)
Dept: FAMILY MEDICINE CLINIC | Age: 55
End: 2025-08-01
Payer: COMMERCIAL

## 2025-08-01 VITALS
SYSTOLIC BLOOD PRESSURE: 148 MMHG | RESPIRATION RATE: 15 BRPM | OXYGEN SATURATION: 95 % | WEIGHT: 207 LBS | DIASTOLIC BLOOD PRESSURE: 83 MMHG | HEART RATE: 74 BPM | TEMPERATURE: 97.2 F | BODY MASS INDEX: 28.04 KG/M2 | HEIGHT: 72 IN

## 2025-08-01 DIAGNOSIS — Z86.711 HX OF PULMONARY EMBOLUS: Primary | ICD-10-CM

## 2025-08-01 DIAGNOSIS — Z13.220 SCREENING, LIPID: ICD-10-CM

## 2025-08-01 DIAGNOSIS — I10 PRIMARY HYPERTENSION: ICD-10-CM

## 2025-08-01 DIAGNOSIS — Z80.0 FAMILY HX OF COLON CANCER: ICD-10-CM

## 2025-08-01 PROCEDURE — 99213 OFFICE O/P EST LOW 20 MIN: CPT | Performed by: STUDENT IN AN ORGANIZED HEALTH CARE EDUCATION/TRAINING PROGRAM

## 2025-08-01 PROCEDURE — 3079F DIAST BP 80-89 MM HG: CPT | Performed by: STUDENT IN AN ORGANIZED HEALTH CARE EDUCATION/TRAINING PROGRAM

## 2025-08-01 PROCEDURE — 3077F SYST BP >= 140 MM HG: CPT | Performed by: STUDENT IN AN ORGANIZED HEALTH CARE EDUCATION/TRAINING PROGRAM

## 2025-08-01 RX ORDER — LOSARTAN POTASSIUM 100 MG/1
100 TABLET ORAL DAILY
Qty: 90 TABLET | Refills: 1 | Status: SHIPPED | OUTPATIENT
Start: 2025-08-01

## 2025-08-01 SDOH — ECONOMIC STABILITY: FOOD INSECURITY: WITHIN THE PAST 12 MONTHS, THE FOOD YOU BOUGHT JUST DIDN'T LAST AND YOU DIDN'T HAVE MONEY TO GET MORE.: NEVER TRUE

## 2025-08-01 SDOH — ECONOMIC STABILITY: FOOD INSECURITY: WITHIN THE PAST 12 MONTHS, YOU WORRIED THAT YOUR FOOD WOULD RUN OUT BEFORE YOU GOT MONEY TO BUY MORE.: NEVER TRUE

## 2025-08-01 SDOH — HEALTH STABILITY: PHYSICAL HEALTH: ON AVERAGE, HOW MANY DAYS PER WEEK DO YOU ENGAGE IN MODERATE TO STRENUOUS EXERCISE (LIKE A BRISK WALK)?: 4 DAYS

## 2025-08-01 SDOH — HEALTH STABILITY: PHYSICAL HEALTH: ON AVERAGE, HOW MANY MINUTES DO YOU ENGAGE IN EXERCISE AT THIS LEVEL?: 30 MIN

## 2025-08-01 ASSESSMENT — ENCOUNTER SYMPTOMS
DIARRHEA: 0
CONSTIPATION: 0
COUGH: 0
BLOOD IN STOOL: 0
NAUSEA: 0
VOMITING: 0
ABDOMINAL PAIN: 0
SHORTNESS OF BREATH: 0

## 2025-08-01 ASSESSMENT — PATIENT HEALTH QUESTIONNAIRE - PHQ9
SUM OF ALL RESPONSES TO PHQ QUESTIONS 1-9: 0
2. FEELING DOWN, DEPRESSED OR HOPELESS: NOT AT ALL
SUM OF ALL RESPONSES TO PHQ QUESTIONS 1-9: 0
1. LITTLE INTEREST OR PLEASURE IN DOING THINGS: NOT AT ALL

## 2025-08-01 NOTE — PROGRESS NOTES
[facility]  Precepting Note    Subjective:  New patient  Hx of PE, Asthma, HTN  On Eliquis  Losartan for HTN  Needs lifelong anticoag  Family hx of colon cancer    ROS otherwise negative     Past medical, surgical, family and social history were reviewed, non-contributory, and unchanged unless otherwise stated.    Objective:    BP (!) 148/83   Pulse 74   Temp 97.2 °F (36.2 °C) (Temporal)   Resp 15   Ht 1.829 m (6' 0.01\")   Wt 93.9 kg (207 lb)   SpO2 95%   BMI 28.07 kg/m²     Exam is as noted by resident with the following changes, additions or corrections:    General:  NAD; alert & oriented x 3   Heart:  RRR, no murmurs, gallops, or rubs.  Lungs:  CTA bilaterally, no wheeze, rales or rhonchi  Abd: bowel sounds present, nontender, nondistended, no masses  Extrem:  No clubbing, cyanosis, or edema    Assessment/Plan:  HTN: increase the dose of Losartan, monitor BP  Hx of PE: continue Eliquis  Fmily hx of colon ca: will follow with gen surgery for colonscopy  Labs as ordered  HM: update     Attending Physician Statement  I have reviewed the chart, including any radiology or labs. I have discussed the case, including pertinent history and exam findings with the resident.  I agree with the assessment, plan and orders as documented by the resident.  Please refer to the resident note for additional information.      Electronically signed by ALEXA CAMARGO MD on 8/1/2025 at 9:41 AM

## 2025-08-01 NOTE — PROGRESS NOTES
[facility]  Family Medicine Residency  Phone: [phone]  Fax: [phone]    Patient:  Wilver Odell 55 y.o. male                                 Date of Service: 8/1/25                            Chiefcomplaint:   Chief Complaint   Patient presents with    New Patient    Medication Refill         History of Present Illness:     The patient is a 55 y.o. male  presented to the clinic with complaints as above.    Here to establish. Was at [facility] before, no longer in network    Past medical history: Hx submassive PE, asthma, HTN    Medications: On eliquis 5 mg BID, on losartan 50 mg daily    Social: Health insurance. No tobacco. Couple drinks a day some days none. 4-5 weekly. No substance use.     Has seen hematology. 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 About a year ago. Was told to be on lifelong anticoagulation. No hx of GI bleeding.     Fam hx of colon cancer in father in 50s. Has had colonoscopy at [facility], 4 years ago. Due for repeat.     Past Medical History:      Diagnosis Date    Asthma     Back pain     Hypertension        Past Surgical History:        Procedure Laterality Date    ADENOIDECTOMY      COLONOSCOPY  9/23/2011       Allergies:    Other and Penicillins    Social History:   Social History     Socioeconomic History    Marital status:      Spouse name: Not on file    Number of children: Not on file    Years of education: Not on file    Highest education level: Not on file   Occupational History    Not on file   Tobacco Use    Smoking status: Never     Passive exposure: Never    Smokeless tobacco: Not on file   Vaping Use    Vaping status: Never Used   Substance and Sexual Activity    Alcohol use: Yes     Alcohol/week: 2.0 standard drinks of alcohol     Types: 2 Cans of beer per week    Drug use: Never    Sexual activity: Not on file   Other Topics Concern    Not on file   Social History Narrative    Not on file     Social Drivers of Health